# Patient Record
Sex: MALE | Race: BLACK OR AFRICAN AMERICAN | Employment: FULL TIME | URBAN - METROPOLITAN AREA
[De-identification: names, ages, dates, MRNs, and addresses within clinical notes are randomized per-mention and may not be internally consistent; named-entity substitution may affect disease eponyms.]

---

## 2017-05-17 ENCOUNTER — TELEPHONE (OUTPATIENT)
Dept: INTERNAL MEDICINE | Age: 47
End: 2017-05-17

## 2017-05-19 ENCOUNTER — APPOINTMENT (OUTPATIENT)
Dept: GENERAL RADIOLOGY | Age: 47
End: 2017-05-19
Payer: MEDICARE

## 2017-05-19 ENCOUNTER — HOSPITAL ENCOUNTER (EMERGENCY)
Age: 47
Discharge: HOME | End: 2017-05-19
Attending: EMERGENCY MEDICINE
Payer: MEDICARE

## 2017-05-19 VITALS
TEMPERATURE: 97.7 F | BODY MASS INDEX: 33.74 KG/M2 | SYSTOLIC BLOOD PRESSURE: 135 MMHG | WEIGHT: 215 LBS | OXYGEN SATURATION: 99 % | DIASTOLIC BLOOD PRESSURE: 77 MMHG | HEIGHT: 67 IN | HEART RATE: 74 BPM | RESPIRATION RATE: 16 BRPM

## 2017-05-19 DIAGNOSIS — M25.562 ACUTE PAIN OF LEFT KNEE: Primary | ICD-10-CM

## 2017-05-19 PROCEDURE — 73562 X-RAY EXAM OF KNEE 3: CPT

## 2017-05-19 PROCEDURE — 99283 EMERGENCY DEPT VISIT LOW MDM: CPT

## 2017-05-19 PROCEDURE — 6370000000 HC RX 637 (ALT 250 FOR IP): Performed by: EMERGENCY MEDICINE

## 2017-05-19 RX ORDER — HYDROCODONE BITARTRATE AND ACETAMINOPHEN 5; 325 MG/1; MG/1
2 TABLET ORAL ONCE
Status: COMPLETED | OUTPATIENT
Start: 2017-05-19 | End: 2017-05-19

## 2017-05-19 RX ORDER — IBUPROFEN 800 MG/1
800 TABLET ORAL EVERY 8 HOURS PRN
Qty: 30 TABLET | Refills: 0 | Status: SHIPPED | OUTPATIENT
Start: 2017-05-19 | End: 2018-10-16

## 2017-05-19 RX ADMIN — HYDROCODONE BITARTRATE AND ACETAMINOPHEN 2 TABLET: 5; 325 TABLET ORAL at 21:19

## 2017-05-19 ASSESSMENT — ENCOUNTER SYMPTOMS
VOMITING: 0
SHORTNESS OF BREATH: 0
ABDOMINAL PAIN: 0
NAUSEA: 0
SORE THROAT: 0
RHINORRHEA: 0
EYE DISCHARGE: 0

## 2017-05-19 ASSESSMENT — PAIN DESCRIPTION - PAIN TYPE: TYPE: ACUTE PAIN

## 2017-05-19 ASSESSMENT — PAIN DESCRIPTION - LOCATION: LOCATION: KNEE

## 2017-05-19 ASSESSMENT — PAIN SCALES - GENERAL
PAINLEVEL_OUTOF10: 10
PAINLEVEL_OUTOF10: 10

## 2017-05-19 ASSESSMENT — PAIN DESCRIPTION - ORIENTATION: ORIENTATION: LEFT

## 2017-05-26 ENCOUNTER — OFFICE VISIT (OUTPATIENT)
Dept: ORTHOPEDIC SURGERY | Age: 47
End: 2017-05-26
Payer: MEDICARE

## 2017-05-26 VITALS — BODY MASS INDEX: 33.74 KG/M2 | WEIGHT: 214.95 LBS | HEIGHT: 67 IN

## 2017-05-26 DIAGNOSIS — Z98.890 STATUS POST ARTHROSCOPY OF RIGHT KNEE: Primary | ICD-10-CM

## 2017-05-26 PROCEDURE — 99213 OFFICE O/P EST LOW 20 MIN: CPT | Performed by: ORTHOPAEDIC SURGERY

## 2017-05-26 RX ORDER — METHYLPREDNISOLONE 4 MG/1
TABLET ORAL
Qty: 1 KIT | Refills: 1 | Status: SHIPPED | OUTPATIENT
Start: 2017-05-26 | End: 2017-06-01

## 2017-05-26 ASSESSMENT — ENCOUNTER SYMPTOMS
COUGH: 0
NAUSEA: 0
DIARRHEA: 0
CONSTIPATION: 0

## 2018-09-29 ENCOUNTER — HOSPITAL ENCOUNTER (EMERGENCY)
Age: 48
Discharge: HOME | End: 2018-09-30
Attending: EMERGENCY MEDICINE
Payer: COMMERCIAL

## 2018-09-29 VITALS
BODY MASS INDEX: 35.16 KG/M2 | OXYGEN SATURATION: 98 % | HEIGHT: 67 IN | DIASTOLIC BLOOD PRESSURE: 90 MMHG | WEIGHT: 224 LBS | RESPIRATION RATE: 18 BRPM | SYSTOLIC BLOOD PRESSURE: 158 MMHG | HEART RATE: 54 BPM | TEMPERATURE: 97.9 F

## 2018-09-29 DIAGNOSIS — M25.512 ACUTE PAIN OF LEFT SHOULDER: Primary | ICD-10-CM

## 2018-09-29 PROCEDURE — 99283 EMERGENCY DEPT VISIT LOW MDM: CPT

## 2018-09-29 ASSESSMENT — PAIN SCALES - GENERAL: PAINLEVEL_OUTOF10: 10

## 2018-09-30 ENCOUNTER — APPOINTMENT (OUTPATIENT)
Dept: GENERAL RADIOLOGY | Age: 48
End: 2018-09-30
Payer: COMMERCIAL

## 2018-09-30 PROCEDURE — 6370000000 HC RX 637 (ALT 250 FOR IP): Performed by: EMERGENCY MEDICINE

## 2018-09-30 PROCEDURE — 73030 X-RAY EXAM OF SHOULDER: CPT

## 2018-09-30 RX ORDER — IBUPROFEN 800 MG/1
800 TABLET ORAL ONCE
Status: COMPLETED | OUTPATIENT
Start: 2018-09-30 | End: 2018-09-30

## 2018-09-30 RX ADMIN — IBUPROFEN 800 MG: 800 TABLET ORAL at 00:13

## 2018-09-30 ASSESSMENT — ENCOUNTER SYMPTOMS
COUGH: 0
ABDOMINAL PAIN: 0
NAUSEA: 0
EYE PAIN: 0
RHINORRHEA: 0
VOMITING: 0
SHORTNESS OF BREATH: 0
COLOR CHANGE: 0
SORE THROAT: 0

## 2018-09-30 ASSESSMENT — PAIN SCALES - GENERAL: PAINLEVEL_OUTOF10: 10

## 2018-09-30 NOTE — ED PROVIDER NOTES
Provider   ibuprofen (ADVIL;MOTRIN) 800 MG tablet Take 1 tablet by mouth every 8 hours as needed for Pain 5/19/17   Lucina June DO   diclofenac (VOLTAREN) 75 MG EC tablet Take 1 tablet by mouth 2 times daily 6/22/16   May Balderas DO       REVIEW OF SYSTEMS    (2-9 systems for level 4, 10 or more for level 5)      Review of Systems   Constitutional: Negative for chills and fever. HENT: Negative for rhinorrhea and sore throat. Eyes: Negative for pain and visual disturbance. Respiratory: Negative for cough and shortness of breath. Cardiovascular: Negative for chest pain and palpitations. Gastrointestinal: Negative for abdominal pain, nausea and vomiting. Genitourinary: Negative for difficulty urinating and dysuria. Musculoskeletal: Negative for arthralgias and myalgias. Left shoulder pain   Skin: Negative for color change and wound. Neurological: Negative for weakness, numbness and headaches. Psychiatric/Behavioral: Negative for behavioral problems and dysphoric mood. PHYSICAL EXAM   (up to 7 for level 4, 8 or more for level 5)      INITIAL VITALS:   BP (!) 158/90   Pulse 54   Temp 97.9 °F (36.6 °C) (Oral)   Resp 18   Ht 5' 7\" (1.702 m)   Wt 224 lb (101.6 kg)   SpO2 98%   BMI 35.08 kg/m²     Physical Exam   Constitutional: He is oriented to person, place, and time. He appears well-developed and well-nourished. No distress. HENT:   Head: Normocephalic and atraumatic. Eyes: Pupils are equal, round, and reactive to light. EOM are normal.   Neck: Normal range of motion. Cardiovascular: Normal rate. Pulmonary/Chest: Effort normal. No respiratory distress. Musculoskeletal: Normal range of motion.    Passive range of motion without any difficulties (family; however, does have some pain with active range of motion; no point tenderness from the shoulder, more so just superior to the left scapula and trapezius area; strong distal pulses with good cap refill; no

## 2018-10-16 ENCOUNTER — OFFICE VISIT (OUTPATIENT)
Dept: INTERNAL MEDICINE | Age: 48
End: 2018-10-16
Payer: COMMERCIAL

## 2018-10-16 VITALS
DIASTOLIC BLOOD PRESSURE: 78 MMHG | SYSTOLIC BLOOD PRESSURE: 122 MMHG | BODY MASS INDEX: 36.41 KG/M2 | HEIGHT: 67 IN | HEART RATE: 70 BPM | WEIGHT: 232 LBS

## 2018-10-16 DIAGNOSIS — Z00.00 HEALTHCARE MAINTENANCE: ICD-10-CM

## 2018-10-16 DIAGNOSIS — S46.912D SHOULDER STRAIN, LEFT, SUBSEQUENT ENCOUNTER: Primary | ICD-10-CM

## 2018-10-16 DIAGNOSIS — E66.09 CLASS 2 OBESITY DUE TO EXCESS CALORIES WITHOUT SERIOUS COMORBIDITY WITH BODY MASS INDEX (BMI) OF 36.0 TO 36.9 IN ADULT: ICD-10-CM

## 2018-10-16 DIAGNOSIS — Z13.220 SCREENING FOR HYPERLIPIDEMIA: ICD-10-CM

## 2018-10-16 PROCEDURE — 99213 OFFICE O/P EST LOW 20 MIN: CPT | Performed by: INTERNAL MEDICINE

## 2018-10-16 PROCEDURE — 99211 OFF/OP EST MAY X REQ PHY/QHP: CPT | Performed by: INTERNAL MEDICINE

## 2018-10-16 RX ORDER — NAPROXEN 500 MG/1
500 TABLET ORAL 2 TIMES DAILY WITH MEALS
Qty: 40 TABLET | Refills: 0 | Status: SHIPPED | OUTPATIENT
Start: 2018-10-16 | End: 2019-01-23 | Stop reason: ALTCHOICE

## 2018-10-16 ASSESSMENT — PATIENT HEALTH QUESTIONNAIRE - PHQ9
SUM OF ALL RESPONSES TO PHQ QUESTIONS 1-9: 0
SUM OF ALL RESPONSES TO PHQ9 QUESTIONS 1 & 2: 0
2. FEELING DOWN, DEPRESSED OR HOPELESS: 0
1. LITTLE INTEREST OR PLEASURE IN DOING THINGS: 0
SUM OF ALL RESPONSES TO PHQ QUESTIONS 1-9: 0

## 2018-10-16 NOTE — PATIENT INSTRUCTIONS
Patient Education        Shoulder Stretches: Exercises  Your Care Instructions  Here are some examples of exercises for your shoulder. Start each exercise slowly. Ease off the exercise if you start to have pain. Your doctor or physical therapist will tell you when you can start these exercises and which ones will work best for you. How to do the exercises  Shoulder stretch    1.  a doorway and place one arm against the door frame. Your elbow should be a little higher than your shoulder. 2. Relax your shoulders as you lean forward, allowing your chest and shoulder muscles to stretch. You can also turn your body slightly away from your arm to stretch the muscles even more. 3. Hold for 15 to 30 seconds. 4. Repeat 2 to 4 times with each arm. Shoulder and chest stretch    1. Shoulder and chest stretch  2. While sitting, relax your upper body so you slump slightly in your chair. 3. As you breathe in, straighten your back and open your arms out to the sides. 4. Gently pull your shoulder blades back and downward. 5. Hold for 15 to 30 seconds as your breathe normally. 6. Repeat 2 to 4 times. Overhead stretch    1. Reach up over your head with both arms. 2. Hold for 15 to 30 seconds. 3. Repeat 2 to 4 times. Follow-up care is a key part of your treatment and safety. Be sure to make and go to all appointments, and call your doctor if you are having problems. It's also a good idea to know your test results and keep a list of the medicines you take. Where can you learn more? Go to https://[employer]. org and sign in to your [employer] account. Enter S254 in the [facility] box to learn more about \"Shoulder Stretches: Exercises. \"     If you do not have an account, please click on the \"Sign Up Now\" link. Current as of: November 29, 2017  Content Version: 11.7  © 0811-0967 [employer]. Care instructions adapted under license by [facility] ([facility]).  If you have questions about a medical condition or this instruction, always ask your healthcare professional. [address] any warranty or liability for your use of this information. Patient Education        Rotator Cuff: Exercises  Your Care Instructions  Here are some examples of typical rehabilitation exercises for your condition. Start each exercise slowly. Ease off the exercise if you start to have pain. Your doctor or physical therapist will tell you when you can start these exercises and which ones will work best for you. How to do the exercises  Pendulum swing    If you have pain in your back, do not do this exercise. 5. Hold on to a table or the back of a chair with your good arm. Then bend forward a little and let your sore arm hang straight down. This exercise does not use the arm muscles. Rather, use your legs and your hips to create movement that makes your arm swing freely. 6. Use the movement from your hips and legs to guide the slightly swinging arm back and forth like a pendulum (or elephant trunk). Then guide it in circles that start small (about the size of a dinner plate). Make the circles a bit larger each day, as your pain allows. 7. Do this exercise for 5 minutes, 5 to 7 times each day. 8. As you have less pain, try bending over a little farther to do this exercise. This will increase the amount of movement at your shoulder. Posterior stretching exercise    7. Hold the elbow of your injured arm with your other hand. 8. Use your hand to pull your injured arm gently up and across your body. You will feel a gentle stretch across the back of your injured shoulder. 9. Hold for at least 15 to 30 seconds. Then slowly lower your arm. 10. Repeat 2 to 4 times. Up-the-back stretch    Your doctor or physical therapist may want you to wait to do this stretch until you have regained most of your range of motion and strength. You can do this stretch in different ways.  Hold any of these stretches for at least 15 to 30 seconds. Repeat them 2 to 4 times. 4. Put your hand in your back pocket. Let it rest there to stretch your shoulder. 5. With your other hand, hold your injured arm (palm outward) behind your back by the wrist. Pull your arm up gently to stretch your shoulder. 6. Next, put a towel over your other shoulder. Put the hand of your injured arm behind your back. Now hold the back end of the towel. With the other hand, hold the front end of the towel in front of your body. Pull gently on the front end of the towel. This will bring your hand farther up your back to stretch your shoulder. Overhead stretch    1. Standing about an arm's length away, grasp onto a solid surface. You could use a countertop, a doorknob, or the back of a sturdy chair. 2. With your knees slightly bent, bend forward with your arms straight. Lower your upper body, and let your shoulders stretch. 3. As your shoulders are able to stretch farther, you may need to take a step or two backward. 4. Hold for at least 15 to 30 seconds. Then stand up and relax. If you had stepped back during your stretch, step forward so you can keep your hands on the solid surface. 5. Repeat 2 to 4 times. Shoulder flexion (lying down)    To make a wand for this exercise, use a piece of PVC pipe or a broom handle with the broom removed. Make the wand about a foot wider than your shoulders. 1. Lie on your back, holding a wand with both hands. Your palms should face down as you hold the wand. 2. Keeping your elbows straight, slowly raise your arms over your head. Raise them until you feel a stretch in your shoulders, upper back, and chest.  3. Hold for 15 to 30 seconds. 4. Repeat 2 to 4 times. Shoulder rotation (lying down)    To make a wand for this exercise, use a piece of PVC pipe or a broom handle with the broom removed. Make the wand about a foot wider than your shoulders. 1. Lie on your back.  Hold a wand with both hands with your

## 2019-01-23 ENCOUNTER — HOSPITAL ENCOUNTER (EMERGENCY)
Age: 49
Discharge: HOME | End: 2019-01-23
Attending: EMERGENCY MEDICINE
Payer: COMMERCIAL

## 2019-01-23 VITALS
HEIGHT: 67 IN | DIASTOLIC BLOOD PRESSURE: 79 MMHG | BODY MASS INDEX: 33.74 KG/M2 | HEART RATE: 58 BPM | RESPIRATION RATE: 20 BRPM | TEMPERATURE: 97.5 F | WEIGHT: 215 LBS | SYSTOLIC BLOOD PRESSURE: 125 MMHG | OXYGEN SATURATION: 100 %

## 2019-01-23 DIAGNOSIS — S43.402A SPRAIN OF LEFT SHOULDER, UNSPECIFIED SHOULDER SPRAIN TYPE, INITIAL ENCOUNTER: Primary | ICD-10-CM

## 2019-01-23 PROCEDURE — 99283 EMERGENCY DEPT VISIT LOW MDM: CPT

## 2019-01-23 PROCEDURE — 6370000000 HC RX 637 (ALT 250 FOR IP): Performed by: NURSE PRACTITIONER

## 2019-01-23 RX ORDER — HYDROCODONE BITARTRATE AND ACETAMINOPHEN 5; 325 MG/1; MG/1
1 TABLET ORAL EVERY 6 HOURS PRN
Qty: 20 TABLET | Refills: 0 | Status: SHIPPED | OUTPATIENT
Start: 2019-01-23 | End: 2019-01-28

## 2019-01-23 RX ORDER — HYDROCODONE BITARTRATE AND ACETAMINOPHEN 5; 325 MG/1; MG/1
2 TABLET ORAL ONCE
Status: COMPLETED | OUTPATIENT
Start: 2019-01-23 | End: 2019-01-23

## 2019-01-23 RX ADMIN — HYDROCODONE BITARTRATE AND ACETAMINOPHEN 2 TABLET: 5; 325 TABLET ORAL at 22:06

## 2019-01-23 ASSESSMENT — PAIN DESCRIPTION - ORIENTATION: ORIENTATION: LEFT

## 2019-01-23 ASSESSMENT — PAIN DESCRIPTION - LOCATION: LOCATION: SHOULDER

## 2019-01-23 ASSESSMENT — PAIN DESCRIPTION - PAIN TYPE: TYPE: ACUTE PAIN

## 2019-01-23 ASSESSMENT — PAIN DESCRIPTION - FREQUENCY: FREQUENCY: CONTINUOUS

## 2019-01-23 ASSESSMENT — PAIN DESCRIPTION - ONSET: ONSET: ON-GOING

## 2019-01-23 ASSESSMENT — PAIN DESCRIPTION - DESCRIPTORS: DESCRIPTORS: SHARP

## 2019-01-23 ASSESSMENT — PAIN DESCRIPTION - PROGRESSION: CLINICAL_PROGRESSION: NOT CHANGED

## 2019-01-23 ASSESSMENT — PAIN SCALES - GENERAL
PAINLEVEL_OUTOF10: 10
PAINLEVEL_OUTOF10: 10

## 2019-01-24 ASSESSMENT — ENCOUNTER SYMPTOMS
SORE THROAT: 0
SINUS PRESSURE: 0
VOMITING: 0
NAUSEA: 0
ABDOMINAL PAIN: 0
COLOR CHANGE: 0
COUGH: 0
RHINORRHEA: 0
WHEEZING: 0
SHORTNESS OF BREATH: 0
CONSTIPATION: 0
DIARRHEA: 0

## 2022-03-04 ENCOUNTER — TELEPHONE (OUTPATIENT)
Dept: INTERNAL MEDICINE | Age: 52
End: 2022-03-04

## 2022-03-04 NOTE — LETTER
[facility] [address]  [address] [address]  Phone: [phone]  Fax: [phone]    Petr Alcocer MD        March 4, 2022     Fito Polk  [facility] [place]      Dear Tyler Frankel: We missed seeing you for a scheduled appointment at [address] [place] [address] with Petr Alcocer MD on 3/4/2022. We're sorry you were unable to keep your appointment and hope that you are doing well. We ask that you please call 24 hours in advance if you are unable to make your appointment, so that we can give that time to another patient in need. We care about you and the management of your healthcare and want to make sure that you follow up as recommended. To provide quality care and timely appointments to all our patients, you may be dismissed from the practice if you do not show for three (3) scheduled appointments within a 12-month period. We would like to continue treating your healthcare needs. Please call the office to reschedule your appointment, if needed.      Sincerely,        Petr Alcocer MD

## 2022-03-17 ENCOUNTER — NURSE TRIAGE (OUTPATIENT)
Dept: OTHER | Facility: CLINIC | Age: 52
End: 2022-03-17

## 2022-03-17 ENCOUNTER — OFFICE VISIT (OUTPATIENT)
Dept: FAMILY MEDICINE CLINIC | Age: 52
End: 2022-03-17
Payer: COMMERCIAL

## 2022-03-17 VITALS
SYSTOLIC BLOOD PRESSURE: 132 MMHG | HEIGHT: 67 IN | OXYGEN SATURATION: 98 % | HEART RATE: 74 BPM | DIASTOLIC BLOOD PRESSURE: 84 MMHG | TEMPERATURE: 97.4 F | BODY MASS INDEX: 31.71 KG/M2 | WEIGHT: 202 LBS

## 2022-03-17 DIAGNOSIS — Z13.220 LIPID SCREENING: ICD-10-CM

## 2022-03-17 DIAGNOSIS — R06.83 SNORING: ICD-10-CM

## 2022-03-17 DIAGNOSIS — M54.12 CERVICAL RADICULOPATHY: Primary | ICD-10-CM

## 2022-03-17 DIAGNOSIS — Z12.5 PROSTATE CANCER SCREENING: ICD-10-CM

## 2022-03-17 DIAGNOSIS — K59.03 THERAPEUTIC OPIOID-INDUCED CONSTIPATION (OIC): ICD-10-CM

## 2022-03-17 DIAGNOSIS — K62.5 BRIGHT RED BLOOD PER RECTUM: ICD-10-CM

## 2022-03-17 DIAGNOSIS — Z13.29 THYROID DISORDER SCREENING: ICD-10-CM

## 2022-03-17 DIAGNOSIS — T40.2X5A THERAPEUTIC OPIOID-INDUCED CONSTIPATION (OIC): ICD-10-CM

## 2022-03-17 DIAGNOSIS — Z13.1 DIABETES MELLITUS SCREENING: ICD-10-CM

## 2022-03-17 PROCEDURE — 99203 OFFICE O/P NEW LOW 30 MIN: CPT | Performed by: NURSE PRACTITIONER

## 2022-03-17 RX ORDER — IBUPROFEN 800 MG/1
800 TABLET ORAL PRN
COMMUNITY

## 2022-03-17 RX ORDER — DOCUSATE SODIUM 100 MG/1
100 CAPSULE, LIQUID FILLED ORAL DAILY PRN
Qty: 30 CAPSULE | Refills: 2 | Status: SHIPPED | OUTPATIENT
Start: 2022-03-17 | End: 2022-07-13

## 2022-03-17 RX ORDER — OXYCODONE HYDROCHLORIDE AND ACETAMINOPHEN 5; 325 MG/1; MG/1
1 TABLET ORAL DAILY
COMMUNITY
End: 2022-07-13

## 2022-03-17 RX ORDER — TIZANIDINE 4 MG/1
4 TABLET ORAL EVERY 6 HOURS PRN
COMMUNITY
End: 2022-07-13

## 2022-03-17 ASSESSMENT — PATIENT HEALTH QUESTIONNAIRE - PHQ9
SUM OF ALL RESPONSES TO PHQ QUESTIONS 1-9: 0
SUM OF ALL RESPONSES TO PHQ QUESTIONS 1-9: 0
SUM OF ALL RESPONSES TO PHQ9 QUESTIONS 1 & 2: 0
SUM OF ALL RESPONSES TO PHQ QUESTIONS 1-9: 0
2. FEELING DOWN, DEPRESSED OR HOPELESS: 0
1. LITTLE INTEREST OR PLEASURE IN DOING THINGS: 0
SUM OF ALL RESPONSES TO PHQ QUESTIONS 1-9: 0

## 2022-03-17 ASSESSMENT — ENCOUNTER SYMPTOMS
BACK PAIN: 0
SINUS PAIN: 0
DIARRHEA: 0
VOMITING: 0
NAUSEA: 0
SHORTNESS OF BREATH: 0
BLOOD IN STOOL: 1
CONSTIPATION: 1
ABDOMINAL PAIN: 0
SORE THROAT: 0
COUGH: 0
EYE PAIN: 0

## 2022-03-17 NOTE — PROGRESS NOTES
[facility]  [address]  [address] [place] [address]  Dept: [phone]  Dept Fax: [phone]    Johanna Carrier is a 54 y.o. male who presents today for his medicalconditions/complaints as noted below. Johanna Carrier is c/o of Neck Pain (pt is having neck pain and stiffness. pt is going to pain managment ) and Rectal Bleeding      HPI:       82-year-old male patient presents with complaints of encounter to establish care. Significant history of chronic neck pain, cervical radiculopathy. Reports that he has had chronic neck pain for several years. Patient follows with premedical spine care Dr. Kristan Cotto, had positive MRI in 2020 has since had a subsequently injury. Patient follows with pain management doctor Lakeisha and treats with Percocet, Zanaflex, Motrin, additionally uses medical marijuana. Patient does have concerns for constipation and blood in his stool. Patient reportedly does not have regular bowel movements. Patient does not treat with any stool softeners or laxatives despite chronic opiate use. Patient denies any rectal pain. Does report blood when wiping and bright red blood in his toilet. Has never had a colonoscopy. Denies abdominal pain, nausea, vomiting or diarrhea. Denies previous abdominal hx or surgeries. Concerns for sleep apnea, patient does snore, witnessed episodes of apnea, chronic fatigue      Past Medical History:   Diagnosis Date    Knee pain, right     trauma at work 11/15    Medial meniscus tear Nov. 18 2015    Snores     possible apnea but not tested yet        Current Outpatient Medications   Medication Sig Dispense Refill    ibuprofen (ADVIL;MOTRIN) 800 MG tablet Take 800 mg by mouth as needed      medical marijuana Inhale into the lungs daily.  oxyCODONE-acetaminophen (PERCOCET) 5-325 MG per tablet Take 1 tablet by mouth daily.       tiZANidine (ZANAFLEX) 4 MG tablet Take 4 mg by mouth every 6 hours as needed Take 1/2 to 1 tablet by mouth two times a day as needed      docusate sodium (COLACE) 100 MG capsule Take 1 capsule by mouth daily as needed for Constipation 30 capsule 2     No current facility-administered medications for this visit. Allergies   Allergen Reactions    Pear Itching    Hydrocodone-Acetaminophen Other (See Comments) and Itching       Subjective:      Review of Systems   Constitutional: Positive for fatigue. Negative for chills. HENT: Negative for congestion, ear pain, sinus pain and sore throat. Eyes: Negative for pain and visual disturbance. Respiratory: Negative for cough and shortness of breath. Cardiovascular: Negative for chest pain and palpitations. Gastrointestinal: Positive for blood in stool and constipation. Negative for abdominal pain, diarrhea, nausea and vomiting. Genitourinary: Negative for penile pain and testicular pain. Musculoskeletal: Positive for neck pain. Negative for back pain and joint swelling. Skin: Negative for rash. Neurological: Negative for dizziness and light-headedness. Hematological: Does not bruise/bleed easily. All other systems reviewed and are negative.      :Objective     Physical Exam  Vitals and nursing note reviewed. Constitutional:       Appearance: Normal appearance. He is normal weight. HENT:      Mouth/Throat:      Mouth: Mucous membranes are moist.   Cardiovascular:      Rate and Rhythm: Normal rate. Pulmonary:      Effort: Pulmonary effort is normal.      Breath sounds: Normal breath sounds. Abdominal:      Palpations: Abdomen is soft. Tenderness: There is no abdominal tenderness. Musculoskeletal:      Cervical back: Spasms, torticollis, tenderness and bony tenderness present. Neurological:      General: No focal deficit present. Mental Status: He is alert and oriented to person, place, and time.    Psychiatric:         Mood and Affect: Mood normal.         Behavior: Behavior normal.       /84 (Site: Left Upper Arm, Position: Sitting, Cuff Size: Medium Adult)   Pulse 74   Temp 97.4 °F (36.3 °C) (Tympanic)   Ht 5' 7\" (1.702 m)   Wt 202 lb (91.6 kg)   SpO2 98%   BMI 31.64 kg/m²     Lab Review   No visits with results within 2 Month(s) from this visit.    Latest known visit with results is:   Hospital Outpatient Visit on 04/25/2016   Component Date Value    WBC 04/25/2016 4.2     RBC 04/25/2016 4.81     Hemoglobin 04/25/2016 12.8*    Hematocrit 04/25/2016 38.8*    MCV 04/25/2016 80.6     MCH 04/25/2016 26.6     MCHC 04/25/2016 33.0     RDW 04/25/2016 14.3     Platelets 79/31/1222 185     MPV 04/25/2016 8.7     Glucose 04/25/2016 110*    BUN 04/25/2016 13     CREATININE 04/25/2016 1.19     Bun/Cre Ratio 04/25/2016 NOT REPORTED     Calcium 04/25/2016 8.5*    Sodium 04/25/2016 145*    Potassium 04/25/2016 4.0     Chloride 04/25/2016 108*    CO2 04/25/2016 24     Anion Gap 04/25/2016 13     Alkaline Phosphatase 04/25/2016 40     ALT 04/25/2016 20     AST 04/25/2016 19     Total Bilirubin 04/25/2016 0.31     Total Protein 04/25/2016 5.8*    Albumin 04/25/2016 3.6     Albumin/Globulin Ratio 04/25/2016 1.6     GFR Non- 04/25/2016 >60     GFR  04/25/2016 >60     GFR Comment 04/25/2016          GFR Staging 04/25/2016 NOT REPORTED     Color, UA 04/25/2016 YELLOW     Turbidity UA 04/25/2016 CLEAR     Glucose, Ur 04/25/2016 NEGATIVE     Bilirubin Urine 04/25/2016 NEGATIVE     Ketones, Urine 04/25/2016 NEGATIVE     Specific Dr[place]co, UA 04/25/2016 1.027     Urine Hgb 04/25/2016 NEGATIVE     pH, UA 04/25/2016 6.0     Protein, UA 04/25/2016 NEGATIVE     Urobilinogen, Urine 04/25/2016 Normal     Nitrite, Urine 04/25/2016 NEGATIVE     Leukocyte Esterase, Urine 04/25/2016 NEGATIVE     Urinalysis Comments 04/25/2016 Microscopic exam not performed based on chemical results unless requested in        Dr. Bret Reyes 2/25/21 left C5-6 and C6-7 TFESI  3/4/21 right C5-6 and C6-7 TFESI  cervical injections with 80-90% relief for several months     -Imaging:   Cervical spine MRI March 17, 2020  Findings:  Cervical vertebral body heights and alignment and signal are normal. Craniocervical junction is normal.  Signal in the cervical spinal cord is normal.   There is reversal of the normal cervical curvature. There are degenerative disc changes at all cervical levels with disc space narrowing and spurs. At C2-3, there is tiny central disc protrusion without spinal stenosis. At C3-4, there is moderate size central disc protrusion/herniation that touches and slightly flattens the anterior mid spinal cord. At C4-5, there is disc bulge and posterior spurs with mild cord flattening. At C5-6, there is degenerative disc disease. There is disc bulge greater towards the right as well as posterior spurring resulting in mild flattening of the right anterior spinal cord   At C6-7, there is left paracentral and foraminal disc protrusion with mild flattening of the left anterior spinal cord. There is posterior spurring. At C7-T1, no disc herniation or spinal stenosis. Impression:  1. C3-4 central disc herniation with flattening of the mid anterior cervical spinal cord. 2. C5-6 disc bulge greater towards the right with mild right cord flattening. 3. C6-7 left paracentral foraminal disc protrusion with mild left cord flattening. Assessment and Plan      1. Cervical radiculopathy  -     CBC; Future  -     MRI CERVICAL SPINE WO CONTRAST; Future  -     [facility] [place]  -     External Referral To Pain Clinic  2. Bright red blood per rectum  -     CBC; Future  -     [facility]  3. Thyroid disorder screening  -     CBC; Future  -     TSH With Reflex Ft4; Future  4. Lipid screening  -     CBC; Future  -     Lipid, Fasting; Future  5.  Diabetes mellitus screening  -     Hemoglobin A1C; Future  -     CBC; Future  -     Comprehensive Metabolic Panel; Future  6. Snoring  -     CBC; Future  -     Baseline Diagnostic Sleep Study; Future  7. Prostate cancer screening  -     PSA Screening; Future  8. Therapeutic opioid-induced constipation (OIC)  -     docusate sodium (COLACE) 100 MG capsule; Take 1 capsule by mouth daily as needed for Constipation, Disp-30 capsule, R-2Normal       Recommend starting stool softener daily given chronic opiate use and colonoscopy    Labs ordered    Previous mri ordered, updated MRI order given injury  Physical therapy sent    F/u after testing           [facility] Sleep Apnea screen    0= no, 1= yes    Snore? 1    Tired, fatigued often? 1    Observed stop breathing? 1    Treatment for high blood pressure? 0    BMI over 35 ? 0    Age over 48? 0    Neck circumference over 40 cm? 1    Gener Male? 0    Score 4indicates higher risk    Range 0-2 = low risk, 3+ = high risk              No results found for this visit on 03/17/22. Return in about 3 months (around 6/17/2022), or if symptoms worsen or fail to improve. Orders Placed This Encounter   Medications    docusate sodium (COLACE) 100 MG capsule     Sig: Take 1 capsule by mouth daily as needed for Constipation     Dispense:  30 capsule     Refill:  2        Patient given educational materials - see patient instructions. Discussed use, benefit, and side effects of prescribed medications. All patientquestions answered. Pt voiced understanding. Patient given educational materials - see patient instructions. Discussed use, benefit, and side effects of prescribed medications. All patientquestions answered. Pt voiced understanding. This note was transcribed using dictation with Dragon services. Efforts were made to correct any errors but some words may be misinterpreted.     Patient assumes risks associated with failure to complete recommended testing and treatments in a timely manner    Electronically signed by David Gupta KELTON Nolasco - CNP on 3/17/2022at 9:17 PM

## 2022-03-17 NOTE — PATIENT INSTRUCTIONS

## 2022-03-17 NOTE — TELEPHONE ENCOUNTER
Received call from Dav Byrnes at [facility] with [employer]. Subjective: Caller's  Wife is on the [facility] had surgery on both shoulders last year for rotator cuff repair. He heard a pop in the right shoulder and there is big knot between the neck and shoulder. He has been having blood in his stool that is bright red. He is on pain management for neck spurs. Unable to move neck without pain. \"     Current Symptoms: Right shoulder pain, blood in stool, neck pain    Onset: 1 week ago; unchanged    Associated Symptoms: reduced activity    Pain Severity: 8/10    Temperature: none     What has been tried: pain medication    LMP: NA Pregnant: NA    Recommended disposition: See in Office Today    Care advice provided, patient verbalizes understanding; denies any other questions or concerns; instructed to call back for any new or worsening symptoms. Patient/Caller agrees with recommended disposition; writer provided warm transfer to Prashant Lynn at [facility] for appointment scheduling     Attention Provider: Thank you for allowing me to participate in the care of your patient. The patient was connected to triage in response to information provided to the ECC/PSC. Please do not respond through this encounter as the response is not directed to a shared pool.         Reason for Disposition   Patient wants to be seen    Protocols used: RECTAL BLEEDING-ADULT-[place]

## 2022-03-24 ENCOUNTER — HOSPITAL ENCOUNTER (OUTPATIENT)
Dept: MRI IMAGING | Facility: CLINIC | Age: 52
Discharge: HOME | End: 2022-03-26
Payer: COMMERCIAL

## 2022-03-24 DIAGNOSIS — M54.12 CERVICAL RADICULOPATHY: ICD-10-CM

## 2022-03-24 PROCEDURE — 72141 MRI NECK SPINE W/O DYE: CPT

## 2022-03-25 DIAGNOSIS — M54.12 CERVICAL RADICULOPATHY: Primary | ICD-10-CM

## 2022-03-28 ENCOUNTER — TELEPHONE (OUTPATIENT)
Dept: FAMILY MEDICINE CLINIC | Age: 52
End: 2022-03-28

## 2022-03-28 DIAGNOSIS — M54.12 CERVICAL RADICULOPATHY: Primary | ICD-10-CM

## 2022-03-28 NOTE — TELEPHONE ENCOUNTER
----- Message from Germania Ortiz sent at 3/25/2022  4:05 PM EDT -----  Subject: Message to Provider    QUESTIONS  Information for Provider? pt called to see if he needed a note to see if   he can stay off of work or see the neuro surgeon  ---------------------------------------------------------------------------  --------------  [address]  What is the best way for the office to contact you? OK to leave message on   voicemail  Preferred Call Back Phone Number? [phone]  ---------------------------------------------------------------------------  --------------  SCRIPT ANSWERS  Relationship to Patient?  Self

## 2022-03-28 NOTE — TELEPHONE ENCOUNTER
Patient advised. Patient states he is going to schedule an appointment with the specialist soon. He wants to do a couple weeks of PT before going back to work to make sure he's healed.

## 2022-03-28 NOTE — LETTER
[address] [place] [address] [address] [address]  Phone: [phone]  Fax: [phone]    KELTON Negrete CNP        March 28, 2022     Patient: Mitesh Antonio   YOB: 1970   Date of Visit: 3/28/2022       To Whom It May Concern: It is my medical opinion that Darus Hopping is excused through 4/3/22. If you have any questions or concerns, please don't hesitate to call.     Sincerely,        KELTON Negrete CNP

## 2022-04-11 ENCOUNTER — TELEPHONE (OUTPATIENT)
Dept: SURGERY | Age: 52
End: 2022-04-11

## 2022-04-11 ENCOUNTER — OFFICE VISIT (OUTPATIENT)
Dept: SURGERY | Age: 52
End: 2022-04-11
Payer: COMMERCIAL

## 2022-04-11 VITALS — BODY MASS INDEX: 31.71 KG/M2 | RESPIRATION RATE: 12 BRPM | HEIGHT: 67 IN | OXYGEN SATURATION: 100 % | WEIGHT: 202 LBS

## 2022-04-11 DIAGNOSIS — R10.32 LEFT GROIN PAIN: Primary | ICD-10-CM

## 2022-04-11 DIAGNOSIS — K59.09 CHRONIC CONSTIPATION: ICD-10-CM

## 2022-04-11 DIAGNOSIS — K62.5 BRBPR (BRIGHT RED BLOOD PER RECTUM): ICD-10-CM

## 2022-04-11 PROCEDURE — 99203 OFFICE O/P NEW LOW 30 MIN: CPT | Performed by: SURGERY

## 2022-04-11 RX ORDER — SOD SULF/POT CHLORIDE/MAG SULF 1.479 G
TABLET ORAL
Qty: 24 TABLET | Refills: 0 | Status: SHIPPED | OUTPATIENT
Start: 2022-04-11 | End: 2022-07-13

## 2022-04-11 NOTE — PROGRESS NOTES
[id number] [address] [facility] Surgery   History & Physical  Darrick DO Piero  Pt Name: Tera Mo  MRN: [id number]  YOB: 1970  Date of evaluation: 4/11/2022  Primary Care Physician: KELTON Sewell - CNP    Chief Complaint: chronic constipation, brbpr, L groin discomfort      SUBJECTIVE:    History of Present Illness: This is a 54 y.o.  male who presents for evaluation for several issues:    1. Chronic constipation, brbpr: pt reports intermittent painless brbpr when he feels constipated, pt has had this for some time and also feels that his ibuprofen may make this worse. Blood in stool is limited and resolves spontaneously. No prior cscope, no [facility]    2. Intermittent L groin pain with strain at work and with BM when he is constipated, thinks he may have had [facility] repair on that side as a baby/child. Denies testicle pain or history of obstruction. Chart review performed to add information to the HPI: Yes    Past Medical History   has a past medical history of Knee pain, right, Medial meniscus tear, and Snores. Past Surgical History   has a past surgical history that includes Umbilical hernia repair (1972); Vasectomy; Knee arthroscopy (Right, 5/3/16); and Rotator cuff repair. Family History  family history is not on file. Social History  Tobacco use:  reports that he quit smoking about 9 years ago. His smoking use included cigarettes. He started smoking about 30 years ago. He has never used smokeless tobacco.  Alcohol use:  reports no history of alcohol use. Drug use:  reports current drug use. Drug: Marijuana Rosi Dasen). Medications  Current Medications:   Current Outpatient Medications   Medication Sig Dispense Refill    Sodium Sulfate-Mag Sulfate-KCl (SUTAB) 2087-883-307 MG TABS Follow bowel prep instructions 24 tablet 0    ibuprofen (ADVIL;MOTRIN) 800 MG tablet Take 800 mg by mouth as needed      medical marijuana Inhale into the lungs daily.       oxyCODONE-acetaminophen (PERCOCET) 5-325 MG per tablet Take 1 tablet by mouth daily.  tiZANidine (ZANAFLEX) 4 MG tablet Take 4 mg by mouth every 6 hours as needed Take 1/2 to 1 tablet by mouth two times a day as needed      docusate sodium (COLACE) 100 MG capsule Take 1 capsule by mouth daily as needed for Constipation 30 capsule 2     No current facility-administered medications for this visit. Home Medications:   Prior to Admission medications    Medication Sig Start Date End Date Taking? Authorizing Provider   Sodium Sulfate-Mag Sulfate-KCl (SUTAB) 5856-445-323 MG TABS Follow bowel prep instructions 4/11/22  Yes Miguel Durham,    ibuprofen (ADVIL;MOTRIN) 800 MG tablet Take 800 mg by mouth as needed   Yes Historical Provider, MD   medical marijuana Inhale into the lungs daily. Yes Historical Provider, MD   oxyCODONE-acetaminophen (PERCOCET) 5-325 MG per tablet Take 1 tablet by mouth daily. Yes Historical Provider, MD   tiZANidine (ZANAFLEX) 4 MG tablet Take 4 mg by mouth every 6 hours as needed Take 1/2 to 1 tablet by mouth two times a day as needed   Yes Historical Provider, MD   docusate sodium (COLACE) 100 MG capsule Take 1 capsule by mouth daily as needed for Constipation 3/17/22  Yes KELTON Stewart - CNP       Allergies  Pear and Hydrocodone-acetaminophen      Review of Systems:  General: Denies any fever, chills. Eyes: Denies any changes in vision, diplopia or eye pain  Ears, Nose, Mouth: Denies changes in hearing/tinnitus or drainage from ears, no rhinorrhea or bloody nose, no difficulty chewing  Throat: no difficulty swallowing, no throat pain  Respiratory: Denies any shortness of breath or cough. Cardiac: Denies any chest pain, palpitations, claudication or edema. Gastrointestinal: chronic constipation, h/o brbpr    Genitourinary: Denies any frequency, urgency, hesitancy or incontinence.   Musculoskeletal: Denies worsening muscle weakness or recent trauma  Skin: Denies rashes or lesions  Psychiatric: Denies any recent changes in mood or affect  Hematologic: Denies bruising or bleeding easily. PHYSICAL EXAMINATION  Vitals:   Vitals:    04/11/22 0938   Resp: 12   SpO2: 100%       General Appearance:  awake, alert, no acute distress, well developed, well nourished   Skin:  Skin color, texture, turgor normal. No rashes or lesions. Head/face:  NCAT, face symmetrical  Eyes:  PERRL, no evidence of conjunctivitis or ptosis bilaterally  Ears:  External ears and canals grossly normal, no evidence of otorrhea. Nose/Sinuses:  Nares normal. Septum midline. Mucosa normal. No external drainage noted. Mouth/Neck:  Mucosa moist.  No external oral lesions. Trachea midline. No visible masses. Lungs:  Normal chest expansion, unlabored breathing without accessory muscle use. No audible rales, rhonchi, or wheezing. Cardiovascular: S1S2. No evidence of JVD. No evidence of pulsatile masses in abdomen  Abdomen:  Soft, non-tender, no organomegaly, no masses. No left inguinal bulge on valsalva although the left inguinal region appears somewhat more prominent compared to the right. Musculoskeletal: No evidence of bony/muscular deformities, trauma, atrophy of either left/right upper/lower extremity. No evidence of digital clubbing or cyanosis. Neurologic:  CN 2-12 grossly intact without obvious deficits. Grossly normal sensation in all extremities. Psychiatric: appropriate judgement and insight, appropriate recall of recent and remote memory, no evidence of depression/anxiety/agitation  Rectal: small external hemorrhoid present without complication          DIAGNOSES:   Diagnosis Orders   1. Left groin pain  US PELVIS LIMITED   2. BRBPR (bright red blood per rectum)     3. Chronic constipation         PLAN:  · Recommend diagnostic colonoscopy, pt is agreeable to this. The risks include bleeding, infection, scarring, perforation, damage to surrounding tissues, need for further surgery in the future.  The benefits, alternatives, complications and procedure details were explained to the pt, all questions were answered. Will plan internal hemorrhoid treatment during this procedure which pt is also agreeable to  · Soft tissue US of the L groin to r/o inguinal hernia, further recommendations to follow.       Medical Decision Making: low complexity     Electronically signed by Jean Paul Barnett DO on 4/11/2022 at 10:09 AM

## 2022-04-11 NOTE — LETTER
[facility]  Surgical Specialties - General Surgery  [address]  [place], [address]  Phone: [phone]  Fax: [phone]      22    Patient: Anna Wise  MRN: [id number]  : 1970  Date of visit: 2022    Dear Juan Alvarez, KELTON - CNP:      Thank you for requesting consultation for Anna Wise in regards to evaluation for chronic constipation, BRBPR, L groin pain. We will obtain soft tissue US to r/o [facility], plan is to proceed with diagnostic colonoscopy with possible internal hemorrhoid treatment to investigate the patient's chronic constipation as well as bloody stools. Below are the relevant portions of my assessment and plan of care. If you have questions, please do not hesitate to call me. I look forward to following Anna Wise along with you.     Sincerely,        Rafal Dahl, DO

## 2022-04-19 DIAGNOSIS — R10.32 LEFT GROIN PAIN: Primary | ICD-10-CM

## 2022-04-20 ENCOUNTER — ANESTHESIA EVENT (OUTPATIENT)
Dept: OPERATING ROOM | Age: 52
End: 2022-04-20
Payer: COMMERCIAL

## 2022-04-20 ENCOUNTER — TELEPHONE (OUTPATIENT)
Dept: SURGERY | Age: 52
End: 2022-04-20

## 2022-04-20 NOTE — TELEPHONE ENCOUNTER
4/20/22-  Spoke to patient, surgery start time moved to 10:30am, arrival time is 9:00am. Patient confirmed.

## 2022-04-22 ENCOUNTER — ANESTHESIA (OUTPATIENT)
Dept: OPERATING ROOM | Age: 52
End: 2022-04-22
Payer: COMMERCIAL

## 2022-04-22 ENCOUNTER — HOSPITAL ENCOUNTER (OUTPATIENT)
Age: 52
Setting detail: OUTPATIENT SURGERY
Discharge: HOME | End: 2022-04-22
Attending: SURGERY | Admitting: SURGERY
Payer: COMMERCIAL

## 2022-04-22 VITALS
BODY MASS INDEX: 31.9 KG/M2 | DIASTOLIC BLOOD PRESSURE: 75 MMHG | RESPIRATION RATE: 19 BRPM | SYSTOLIC BLOOD PRESSURE: 119 MMHG | WEIGHT: 203.25 LBS | TEMPERATURE: 97 F | HEART RATE: 51 BPM | HEIGHT: 67 IN | OXYGEN SATURATION: 98 %

## 2022-04-22 VITALS
DIASTOLIC BLOOD PRESSURE: 54 MMHG | SYSTOLIC BLOOD PRESSURE: 89 MMHG | OXYGEN SATURATION: 100 % | RESPIRATION RATE: 23 BRPM

## 2022-04-22 PROCEDURE — 45384 COLONOSCOPY W/LESION REMOVAL: CPT | Performed by: SURGERY

## 2022-04-22 PROCEDURE — 7100000010 HC PHASE II RECOVERY - FIRST 15 MIN: Performed by: SURGERY

## 2022-04-22 PROCEDURE — 7100000011 HC PHASE II RECOVERY - ADDTL 15 MIN: Performed by: SURGERY

## 2022-04-22 PROCEDURE — 2709999900 HC NON-CHARGEABLE SUPPLY: Performed by: SURGERY

## 2022-04-22 PROCEDURE — 3700000000 HC ANESTHESIA ATTENDED CARE: Performed by: SURGERY

## 2022-04-22 PROCEDURE — 2500000003 HC RX 250 WO HCPCS: Performed by: SPECIALIST

## 2022-04-22 PROCEDURE — 88305 TISSUE EXAM BY PATHOLOGIST: CPT

## 2022-04-22 PROCEDURE — 46930 DESTROY INTERNAL HEMORRHOIDS: CPT | Performed by: SURGERY

## 2022-04-22 PROCEDURE — 3609010400 HC COLONOSCOPY POLYPECTOMY HOT BIOPSY: Performed by: SURGERY

## 2022-04-22 PROCEDURE — 2580000003 HC RX 258: Performed by: ANESTHESIOLOGY

## 2022-04-22 PROCEDURE — C1713 ANCHOR/SCREW BN/BN,TIS/BN: HCPCS | Performed by: SURGERY

## 2022-04-22 PROCEDURE — 3700000001 HC ADD 15 MINUTES (ANESTHESIA): Performed by: SURGERY

## 2022-04-22 PROCEDURE — 6360000002 HC RX W HCPCS: Performed by: SPECIALIST

## 2022-04-22 RX ORDER — SODIUM CHLORIDE 0.9 % (FLUSH) 0.9 %
5-40 SYRINGE (ML) INJECTION PRN
Status: DISCONTINUED | OUTPATIENT
Start: 2022-04-22 | End: 2022-04-22 | Stop reason: HOSPADM

## 2022-04-22 RX ORDER — PROPOFOL 10 MG/ML
INJECTION, EMULSION INTRAVENOUS PRN
Status: DISCONTINUED | OUTPATIENT
Start: 2022-04-22 | End: 2022-04-22 | Stop reason: SDUPTHER

## 2022-04-22 RX ORDER — MEPERIDINE HYDROCHLORIDE 50 MG/ML
12.5 INJECTION INTRAMUSCULAR; INTRAVENOUS; SUBCUTANEOUS ONCE
Status: DISCONTINUED | OUTPATIENT
Start: 2022-04-22 | End: 2022-04-22 | Stop reason: HOSPADM

## 2022-04-22 RX ORDER — LIDOCAINE HYDROCHLORIDE 10 MG/ML
INJECTION, SOLUTION EPIDURAL; INFILTRATION; INTRACAUDAL; PERINEURAL PRN
Status: DISCONTINUED | OUTPATIENT
Start: 2022-04-22 | End: 2022-04-22 | Stop reason: SDUPTHER

## 2022-04-22 RX ORDER — SODIUM CHLORIDE, SODIUM LACTATE, POTASSIUM CHLORIDE, CALCIUM CHLORIDE 600; 310; 30; 20 MG/100ML; MG/100ML; MG/100ML; MG/100ML
INJECTION, SOLUTION INTRAVENOUS CONTINUOUS
Status: DISCONTINUED | OUTPATIENT
Start: 2022-04-22 | End: 2022-04-22 | Stop reason: HOSPADM

## 2022-04-22 RX ORDER — SODIUM CHLORIDE 0.9 % (FLUSH) 0.9 %
5-40 SYRINGE (ML) INJECTION EVERY 12 HOURS SCHEDULED
Status: DISCONTINUED | OUTPATIENT
Start: 2022-04-22 | End: 2022-04-22 | Stop reason: HOSPADM

## 2022-04-22 RX ORDER — SODIUM CHLORIDE 9 MG/ML
25 INJECTION, SOLUTION INTRAVENOUS PRN
Status: DISCONTINUED | OUTPATIENT
Start: 2022-04-22 | End: 2022-04-22 | Stop reason: HOSPADM

## 2022-04-22 RX ORDER — PROPOFOL 10 MG/ML
INJECTION, EMULSION INTRAVENOUS CONTINUOUS PRN
Status: DISCONTINUED | OUTPATIENT
Start: 2022-04-22 | End: 2022-04-22 | Stop reason: SDUPTHER

## 2022-04-22 RX ORDER — ONDANSETRON 2 MG/ML
4 INJECTION INTRAMUSCULAR; INTRAVENOUS
Status: DISCONTINUED | OUTPATIENT
Start: 2022-04-22 | End: 2022-04-22 | Stop reason: HOSPADM

## 2022-04-22 RX ORDER — DIPHENHYDRAMINE HYDROCHLORIDE 50 MG/ML
12.5 INJECTION INTRAMUSCULAR; INTRAVENOUS
Status: DISCONTINUED | OUTPATIENT
Start: 2022-04-22 | End: 2022-04-22 | Stop reason: HOSPADM

## 2022-04-22 RX ORDER — SODIUM CHLORIDE 9 MG/ML
INJECTION, SOLUTION INTRAVENOUS CONTINUOUS
Status: DISCONTINUED | OUTPATIENT
Start: 2022-04-22 | End: 2022-04-22 | Stop reason: HOSPADM

## 2022-04-22 RX ORDER — SODIUM CHLORIDE 9 MG/ML
INJECTION, SOLUTION INTRAVENOUS PRN
Status: DISCONTINUED | OUTPATIENT
Start: 2022-04-22 | End: 2022-04-22 | Stop reason: HOSPADM

## 2022-04-22 RX ADMIN — SODIUM CHLORIDE, POTASSIUM CHLORIDE, SODIUM LACTATE AND CALCIUM CHLORIDE: 600; 310; 30; 20 INJECTION, SOLUTION INTRAVENOUS at 10:55

## 2022-04-22 RX ADMIN — LIDOCAINE HYDROCHLORIDE 50 MG: 10 INJECTION, SOLUTION EPIDURAL; INFILTRATION; INTRACAUDAL; PERINEURAL at 11:35

## 2022-04-22 RX ADMIN — PROPOFOL 40 MG: 10 INJECTION, EMULSION INTRAVENOUS at 11:47

## 2022-04-22 RX ADMIN — PROPOFOL 120 MCG/KG/MIN: 10 INJECTION, EMULSION INTRAVENOUS at 11:35

## 2022-04-22 RX ADMIN — PROPOFOL 100 MG: 10 INJECTION, EMULSION INTRAVENOUS at 11:35

## 2022-04-22 RX ADMIN — PROPOFOL 20 MG: 10 INJECTION, EMULSION INTRAVENOUS at 11:52

## 2022-04-22 ASSESSMENT — PULMONARY FUNCTION TESTS
PIF_VALUE: 0

## 2022-04-22 ASSESSMENT — PAIN SCALES - GENERAL
PAINLEVEL_OUTOF10: 0

## 2022-04-22 ASSESSMENT — PAIN - FUNCTIONAL ASSESSMENT: PAIN_FUNCTIONAL_ASSESSMENT: 0-10

## 2022-04-22 NOTE — ANESTHESIA POSTPROCEDURE EVALUATION
POST- ANESTHESIA EVALUATION       Pt Name: Henny Jose  MRN: [id number]  [place]: 1970  Date of evaluation: 4/22/2022  Time:  1:48 PM      /75   Pulse 51   Temp 97 °F (36.1 °C) (Temporal)   Resp 19   Ht 5' 7\" (1.702 m)   Wt 203 lb 4 oz (92.2 kg)   SpO2 98%   BMI 31.83 kg/m²      Consciousness Level  Awake  Cardiopulmonary Status  Stable  Pain Adequately Treated YES  Nausea / Vomiting  NO  Adequate Hydration  YES  Anesthesia Related Complications NONE      Electronically signed by Barbara aTylor MD on 4/22/2022 at 1:48 PM       Department of Anesthesiology  Postprocedure Note    Patient: Henny Jose  MRN: [id number]  YOB: 1970  Date of evaluation: 4/22/2022  Time:  1:48 PM     Procedure Summary     Date: 04/22/22 Room / Location: [address]    Anesthesia Start: 1091 Anesthesia Stop: 7545    Procedure: COLONOSCOPY POLYPECTOMY HOT BIOPSY WITH INTERNAL HEMORRHOID TREATMENT (N/A ) Diagnosis: (K62.5 HISTORY OF BRIGHT RED BLOOD PER RECTUM)    Surgeons: Tiffanie Penn DO Responsible Provider: Barbara Taylor MD    Anesthesia Type: MAC ASA Status: 2          Anesthesia Type: MAC    Kendra Phase I: Kendra Score: 10    Kendra Phase II: Kendra Score: 10    Last vitals: Reviewed and per EMR flowsheets.        Anesthesia Post Evaluation

## 2022-04-22 NOTE — ANESTHESIA PRE PROCEDURE
Department of Anesthesiology  Preprocedure Note       Name:  Lucia Hunter   Age:  54 y.o.  :  1970                                          MRN:  [id number]         Date:  2022      Surgeon: Allyson Huang):  Justin Birmingham DO    Procedure: Procedure(s):  COLONOSCOPY DIAGNOSTIC possible INTERNAL HEMORRHOID TREATMENT    Medications prior to admission:   Prior to Admission medications    Medication Sig Start Date End Date Taking? Authorizing Provider   Sodium Sulfate-Mag Sulfate-KCl (SUTAB) 5652-420-865 MG TABS Follow bowel prep instructions 22   Justin Birmingham DO   ibuprofen (ADVIL;MOTRIN) 800 MG tablet Take 800 mg by mouth as needed    Historical Provider, MD   medical marijuana Inhale into the lungs daily. Historical Provider, MD   oxyCODONE-acetaminophen (PERCOCET) 5-325 MG per tablet Take 1 tablet by mouth daily. Historical Provider, MD   tiZANidine (ZANAFLEX) 4 MG tablet Take 4 mg by mouth every 6 hours as needed Take 1/2 to 1 tablet by mouth two times a day as needed    Historical Provider, MD   docusate sodium (COLACE) 100 MG capsule Take 1 capsule by mouth daily as needed for Constipation 3/17/22   KELTON Solitario - CNP       Current medications:    No current facility-administered medications for this encounter. Allergies:     Allergies   Allergen Reactions    Pear Itching    Hydrocodone-Acetaminophen Other (See Comments) and Itching       Problem List:    Patient Active Problem List   Diagnosis Code    Chronic pain of right knee M25.561, G89.29    Chondromalacia of knee M94.269       Past Medical History:        Diagnosis Date    Knee pain, right     trauma at work 11/15    Medial meniscus tear 2015    Snores     possible apnea but not tested yet       Past Surgical History:        Procedure Laterality Date    KNEE ARTHROSCOPY Right 5/3/16    chondroplasty/synovectomy    ROTATOR CUFF REPAIR      UMBILICAL HERNIA REPAIR  1972    VASECTOMY         Social History:    Social History     Tobacco Use    Smoking status: Former Smoker     Types: Cigarettes     Start date: 1992     Quit date: 2013     Years since quittin.1    Smokeless tobacco: Never Used   Substance Use Topics    Alcohol use: No     Alcohol/week: 0.0 standard drinks                                Counseling given: Not Answered      Vital Signs (Current): There were no vitals filed for this visit. BP Readings from Last 3 Encounters:   22 132/84   19 125/79   10/16/18 122/78       NPO Status:                                                                                 BMI:   Wt Readings from Last 3 Encounters:   22 202 lb (91.6 kg)   22 202 lb (91.6 kg)   19 215 lb (97.5 kg)     There is no height or weight on file to calculate BMI.    CBC:   Lab Results   Component Value Date    WBC 4.2 2016    RBC 4.81 2016    HGB 12.8 2016    HCT 38.8 2016    MCV 80.6 2016    RDW 14.3 2016     2016       CMP:   Lab Results   Component Value Date     2016    K 4.0 2016     2016    CO2 24 2016    BUN 13 2016    CREATININE 1.19 2016    GFRAA >60 2016    LABGLOM >60 2016    GLUCOSE 110 2016    PROT 5.8 2016    CALCIUM 8.5 2016    BILITOT 0.31 2016    ALKPHOS 40 2016    AST 19 2016    ALT 20 2016       POC Tests: No results for input(s): POCGLU, POCNA, POCK, POCCL, POCBUN, POCHEMO, POCHCT in the last 72 hours.     Coags: No results found for: PROTIME, INR, APTT    HCG (If Applicable): No results found for: PREGTESTUR, PREGSERUM, HCG, HCGQUANT     ABGs: No results found for: PHART, PO2ART, JDX4SLD, VLS4JNZ, BEART, L8ONUGRE     Type & Screen (If Applicable):  No results found for: LABABO, LABRH    Drug/Infectious Status (If Applicable):  No results found for: HIV, HEPCAB    COVID-19 Screening (If Applicable): No results found for: COVID19        Anesthesia Evaluation  Patient summary reviewed and Nursing notes reviewed no history of anesthetic complications:   Airway: Mallampati: II  TM distance: >3 FB   Neck ROM: full  Mouth opening: > = 3 FB Dental: normal exam         Pulmonary:Negative Pulmonary ROS and normal exam  breath sounds clear to auscultation                             Cardiovascular:Negative CV ROS            Rhythm: regular  Rate: normal                    Neuro/Psych:   Negative Neuro/Psych ROS              GI/Hepatic/Renal:   (+) bowel prep,           Endo/Other: Negative Endo/Other ROS                    Abdominal:       Abdomen: soft. Vascular: negative vascular ROS. Other Findings:           Anesthesia Plan      MAC     ASA 2             Anesthetic plan and risks discussed with patient. Plan discussed with CRNA.                   Moon Candelario MD   4/22/2022

## 2022-04-22 NOTE — H&P
[address]      Patient's Name/ Date of Birth/ Gender: Luli Hercules / 1970 (54 y.o.) / male     Attending physician: Madeleine Morrissey DO    CC: h/o BRBPR    History of present Illness: Luli Hercules is a 54 y.o. male, presents today for colonoscopy for investigation into BRBPR    Colonoscopy history: No prior cscope. Past Medical History:  has a past medical history of Knee pain, right, Medial meniscus tear, and Snores. Past Surgical History:   Past Surgical History:   Procedure Laterality Date    KNEE ARTHROSCOPY Right 5/3/16    chondroplasty/synovectomy    ROTATOR CUFF REPAIR      UMBILICAL HERNIA REPAIR  1972    VASECTOMY         Social History:  reports that he quit smoking about 9 years ago. His smoking use included cigarettes. He started smoking about 30 years ago. He has never used smokeless tobacco. He reports current drug use. Drug: Marijuana Milagro Dinning). He reports that he does not drink alcohol. Family History: family history is not on file. Review of Systems:   General: Denies fever, chills, night sweats, weight loss, malaise, fatigue  HEENT: Denies sore throat, sinus problems, allergic rhinosinusitis  Card: Denies chest pain, palpitations, orthopnea/PND. Denies h/o murmurs  Pulm: Denies cough, shortness of breath, MORSE  GI:  per HPI; denies history of constipation, diarrhea, hematochezia or melena  : Denies polyuria, dysuria, hematuria  Endo: Denies diabetes, thyroid problems. Heme: Denies anemia, h/o bleeding or clotting problems. Neuro: Denies h/o CVA, TIA  Skin: Denies rashes, ulcers  Musculoskeletal: Denies muscle, joint, back pain. Allergies: Pear and Hydrocodone-acetaminophen    Current Meds:No current facility-administered medications for this encounter.     Physical Exam:  Vital signs and Nurse's note reviewed  Gen:  A&Ox3, NAD  HEENT: NCAT, PERRLA, EOMI, no scleral icterus, oral mucosa moist  Neck: Trachea midline without obvious masses or lesions  Chest: Symmetric rise with inhalation, no evidence of trauma  CVS: S1S2  Resp: Good bilateral air entry, no audible wheeze or rhonchi  Abd: soft, non-tender, non-distended, no hepatosplenomegaly or palpable masses  Ext: No clubbing, cyanosis, edema, peripheral pulses 2+ Rad/Fem/DP/PT  CNS: Moves all extremities, no gross focal motor deficits  Skin: No erythema or ulcerations         Assessment:    Nicholas Beckman is a 54 y.o. male presents for colonoscopy for BRBPR    Plan:    1. To OR for colonoscopy possible internal hemorrhoid treatment. The risks include bleeding, infection, scarring, perforation, damage to surrounding tissues, need for further surgery in the future. The benefits, alternatives, complications and procedure details were explained to the patient, all questions were answered.           Miguel Durham DO  4/22/2022

## 2022-04-23 NOTE — OP NOTE
Operative Note      Patient: Arnie Dawson  YOB: 1970  MRN: [id number]    Date of Procedure: 4/22/2022    Pre-Op Diagnosis: K62.5 HISTORY OF BRIGHT RED BLOOD PER RECTUM    Post-Op Diagnosis:   Incomplete bowel prep, no large masses seen  Moderate internal hemorrhoids with erythematous mucosa       Procedure(s):  COLONOSCOPY POLYPECTOMY HOT BIOPSY WITH INTERNAL HEMORRHOID TREATMENT    Surgeon(s):  Tong Castañeda DO    Assistant:   * No surgical staff found *    Anesthesia: Monitor Anesthesia Care    Estimated Blood Loss (mL): none    Complications: None    Specimens:   ID Type Source Tests Collected by Time Destination   A : RECTAL POLYP X 3 Tissue Rectum SURGICAL PATHOLOGY Tong Castañeda DO 4/22/2022 1156        Implants:  * No implants in log *      Drains: * No LDAs found *    Findings: as above    Detailed Description of Procedure:     INDICATIONS FOR PROCEDURE:   The patient is a 54y.o. year old male with history of above preop diagnosis. Colonoscopy with possible biopsy or polypectomy was recommend, the risk, benefits, expected outcome, and alternatives to the procedure were explained. Risks included but are not limited to bleeding, infection, respiratory distress, hypotension, and perforation of the colon. The patient understands and is in agreement. PROCEDURE DETAILS:  Patient was brought to the endoscopy suite and placed in the left lateral recumbent position. A time out was completed verifying correct patient, procedure and equipment. Anesthesia was induced using MAC guidelines. A digital rectal exam was performed. The colonoscope was inserted into the rectum without difficulty and the scope was advanced to the cecum which was identified by anatomy and by palpation. Bowel prep was adequate. The scope was slowly withdrawn visualizing the cecum, ascending colon, transverse colon, descending colon, sigmoid colon and rectum.  The scope was withdrawn to the rectal vault and then retroflexed. The scope was then straightened and removed. Internal hemorrhoids grade 1 and 2, bipolar thermal energy device was used to coagulate the blood supply to the hemorrhoids 1cm above the actual hemorrhoid and above the dentate line, cautery was applied to a maximum temperature of 55 deg Celsius. There were no complications. The patient tolerated the procedure well and was transferred to the recovery unit in stable condition. Findings:    Polyps:    none    Other findings:    Moderate amount of fecal and vegetable matter of the right colon, lesser amount elsewhere. Unable to fully mobilize this amount of stool with irrigation, no large masses seen but smaller masses may have been missed   Grade 1 and 2 internal hemorrhoids with erythematous mucosa, no evidence of infection or active bleeding    Greater than 6 minutes was spent withdrawing the colonoscope. IMPRESSION/PLAN:   1. Increase dietary fiber and water  2. Patient is advised to have a repeat colonoscopy in 1 years due to poor bowel prep, will need two day prep with Golytely  3.  Colonoscopy sooner if blood in the stool, unexplained weight loss, or change in the bowels          Electronically signed by Jean Paul Barnett DO on 4/23/2022 at 7:35 AM

## 2022-04-26 LAB — SURGICAL PATHOLOGY REPORT: NORMAL

## 2022-05-09 ENCOUNTER — OFFICE VISIT (OUTPATIENT)
Dept: NEUROSURGERY | Age: 52
End: 2022-05-09
Payer: COMMERCIAL

## 2022-05-09 VITALS
HEART RATE: 60 BPM | HEIGHT: 67 IN | DIASTOLIC BLOOD PRESSURE: 72 MMHG | WEIGHT: 196 LBS | TEMPERATURE: 97.7 F | SYSTOLIC BLOOD PRESSURE: 112 MMHG | BODY MASS INDEX: 30.76 KG/M2

## 2022-05-09 DIAGNOSIS — M47.22 CERVICAL SPONDYLOSIS WITH RADICULOPATHY: Primary | ICD-10-CM

## 2022-05-09 PROCEDURE — 99203 OFFICE O/P NEW LOW 30 MIN: CPT | Performed by: NURSE PRACTITIONER

## 2022-05-09 NOTE — PROGRESS NOTES
[address] [facility] KAVITHA Knutson  MOB # [address] [facility] [place] [address]  Dept: [phone]    Patient:  Marcela Obando  YOB: 1970  Date: 5/9/22    The patient is a 54 y.o. male who presents today for consult of the following problems:     Chief Complaint   Patient presents with    Neurologic Problem         HPI:     Marcela Obando is a 54 y.o. male on whom neurosurgical consultation was requested by KELTON Arshad CNP for management of neck pain and arm symptoms. Patient is several year history of neck pain with radiation into both upper extremities in nondermatomal distribution. Patient has completed remote physical therapy, as well as pain management interventions. Describes likely radiofrequency ablations that did provide relief for a period of time, but feels that they have worn off. Follows with outside pain specialist, last interventions in November. Patient is maintained on chronic opioid therapy, muscle relaxer, as well as NSAIDs. Pain significantly worsened with range of motion. Denies dropping objects, distinct dexterity issues. No gait instability. History:     Past Medical History:   Diagnosis Date    Knee pain, right     trauma at work 11/15    Medial meniscus tear Nov. 18 2015    Snores     possible apnea but not tested yet     Past Surgical History:   Procedure Laterality Date    COLONOSCOPY  04/22/2022    COLONOSCOPY N/A 4/22/2022    COLONOSCOPY POLYPECTOMY HOT BIOPSY WITH INTERNAL HEMORRHOID TREATMENT performed by Glenn Ansari DO at [address] [place] [address] ARTHROSCOPY Right 5/3/16    chondroplasty/synovectomy    ROTATOR CUFF REPAIR      UMBILICAL HERNIA REPAIR  1972    VASECTOMY       History reviewed. No pertinent family history.   Current Outpatient Medications on File Prior to Visit   Medication Sig Dispense Refill    ibuprofen (ADVIL;MOTRIN) 800 MG tablet Take 800 mg by mouth as needed      oxyCODONE-acetaminophen (PERCOCET) 5-325 MG per tablet Take 1 tablet by mouth daily.  tiZANidine (ZANAFLEX) 4 MG tablet Take 4 mg by mouth every 6 hours as needed Take 1/2 to 1 tablet by mouth two times a day as needed      Sodium Sulfate-Mag Sulfate-KCl (SUTAB) 8061-221-755 MG TABS Follow bowel prep instructions (Patient not taking: Reported on 2022) 24 tablet 0    medical marijuana Inhale into the lungs daily.  docusate sodium (COLACE) 100 MG capsule Take 1 capsule by mouth daily as needed for Constipation (Patient not taking: Reported on 2022) 30 capsule 2     No current facility-administered medications on file prior to visit. Social History     Tobacco Use    Smoking status: Former Smoker     Types: Cigarettes     Start date: 1992     Quit date: 2013     Years since quittin.2    Smokeless tobacco: Never Used   Substance Use Topics    Alcohol use: No     Alcohol/week: 0.0 standard drinks    Drug use: Yes     Types: Marijuana (Weed)       Allergies   Allergen Reactions    Pear Itching    Hydrocodone-Acetaminophen Other (See Comments) and Itching       Review of Systems  Constitutional: Negative for activity change and appetite change. HENT: Negative for ear pain and facial swelling. Eyes: Negative for discharge and itching. Respiratory: Negative for choking and chest tightness. Cardiovascular: Negative for chest pain and leg swelling. Gastrointestinal: Negative for nausea and abdominal pain. Endocrine: Negative for cold intolerance and heat intolerance. Genitourinary: Negative for frequency and flank pain. Musculoskeletal: Negative for myalgias and joint swelling. Skin: Negative for rash and wound. Allergic/Immunologic: Negative for environmental allergies and food allergies. Hematological: Negative for adenopathy. Does not bruise/bleed easily.    Psychiatric/Behavioral: Negative for self-injury. The patient is not nervous/anxious. Physical Exam:      /72 (Site: Left Upper Arm, Position: Sitting, Cuff Size: Large Adult)   Pulse 60   Temp 97.7 °F (36.5 °C)   Ht 5' 7\" (1.702 m)   Wt 196 lb (88.9 kg)   BMI 30.70 kg/m²   Estimated body mass index is 30.7 kg/m² as calculated from the following:    Height as of this encounter: 5' 7\" (1.702 m). Weight as of this encounter: 196 lb (88.9 kg). General:  Jose De Jesus Armijo is a 54y.o. year old male who appears his stated age. HEENT: Normocephalic atraumatic. Neck supple. Chest: regular rate; pulses equal  Abdomen: Soft nontender nondistended. Ext: DP and PT pulses 2+, good cap refill  Neuro    Mentation  Appropriate affect  Registration intact  Orientation intact  Judgment intact to situation    Cranial Nerves:   Pupils equal and reactive to light  Extraocular motion intact  Face and shrug symmetric  Tongue midline  No dysarthria  v1-3 sensation symmetric, masseter tone symmetric  Hearing symmetric and intact    Sensation: Intact    Motor  L deltoid 5/5; R deltoid 5/5  L biceps 5/5; R biceps 5/5  L triceps 5/5; R triceps 5/5  L wrist extension 5/5; R wrist extension 5/5  L intrinsics 5/5; R intrinsics 5/5     L iliopsoas 5/5 , R iliopsoas 5/5  L quadriceps 5/5; R quadriceps 5/5  L Dorsiflexion 5/5; R dorsiflexion 5/5  L Plantarflexion 5/5; R plantarflexion 5/5  L EHL 5/5; R EHL 5/5    Reflexes  L Brachioradialis 2+/4; R brachioradialis 2+/4  L Biceps 2+/4; R Biceps 2+/4  L Triceps 2+/4; R Triceps 2+/4  L Patellar 2+/4: R Patellar 2+/4  L Achilles 2+/4; R Achilles 2+/4    hoffmans L: neg  hoffmans R: neg  Clonus L: neg  Clonus R: neg  Babinski L: neg  Babinski R: neg    Spurlings: No  Lhermittes: No    +TTP bilateral trapezius    Studies Review:     MRI cervical spine 3/24/2022 (images reviewed):   FINDINGS:   BONES/ALIGNMENT: Straightening the cervical spine with mild reversal of   curvature.  No significant spondylolisthesis.  Vertebral body heights appear   preserved.  Marrow signal appears within normal limits.  No evidence of acute   fracture or aggressive appearing osseous lesions.  Mild multilevel   intervertebral disc space narrowing.       SPINAL CORD: No abnormal cord signal is seen.       SOFT TISSUES: No paraspinal mass identified.       Congenital canal stenosis.       C2-C3: Disc bulge.  Mild thecal sac stenosis.  No significant foraminal   narrowing.       C3-C4: Central disc herniation.  Moderate/severe severe thecal sac stenosis. Mild left foraminal narrowing.       C4-C5: Disc bulge.  Uncovertebral hypertrophy.  Moderate thecal sac stenosis. Moderate left foraminal narrowing.       C5-C6: Disc bulge.  Uncovertebral hypertrophy.  Moderate thecal sac stenosis. Mild bilateral foraminal narrowing.       C6-C7: Disc bulge eccentric to the left.  Uncovertebral hypertrophy. Moderate thecal sac stenosis.  Mild right and severe left foraminal narrowing.       C7-T1: No significant thecal sac stenosis or foraminal narrowing. MRI cervical spine 3/17/2020:  Findings:   Cervical vertebral body heights and alignment and signal are normal. Craniocervical junction is normal.   Signal in the cervical spinal cord is normal.   There is reversal of the normal cervical curvature. There are degenerative disc changes at all cervical levels with disc space narrowing and spurs. At C2-3, there is tiny central disc protrusion without spinal stenosis. At C3-4, there is moderate size central disc protrusion/herniation that touches and slightly flattens the anterior mid spinal cord. At C4-5, there is disc bulge and posterior spurs with mild cord flattening. At C5-6, there is degenerative disc disease. There is disc bulge greater towards the right as well as posterior spurring resulting in mild flattening of the right anterior spinal cord.      At C6-7, there is left paracentral and foraminal disc protrusion with mild flattening of the left anterior spinal cord. There is posterior spurring. At C7-T1, no disc herniation or spinal stenosis. Assessment and Plan:      1. Cervical spondylosis with radiculopathy          Plan: Recommend proceeding with updated course of physical therapy. Referral provided. We will obtain outside records from pain management to determine what interventions were previously completed that provided relief for a period of time. Patient to return in 10 to 12 weeks for reevaluation. Followup: Return in about 3 months (around 8/9/2022), or if symptoms worsen or fail to improve. Prescriptions Ordered:  No orders of the defined types were placed in this encounter. Orders Placed:  Orders Placed This Encounter   Procedures   [facility]     Referral Priority:   Routine     Referral Type:   Eval and Treat     Referral Reason:   Specialty Services Required     Requested Specialty:   Physical Therapy     Number of Visits Requested:   1        Electronically signed by KELTON Gauthier CNP on 5/9/2022 at 4:00 PM    Please note that this chart was generated using voice recognition Dragon dictation software. Although every effort was made to ensure the accuracy of this automated transcription, some errors in transcription may have occurred.

## 2022-07-12 ENCOUNTER — APPOINTMENT (OUTPATIENT)
Dept: GENERAL RADIOLOGY | Age: 52
End: 2022-07-12
Payer: COMMERCIAL

## 2022-07-12 ENCOUNTER — APPOINTMENT (OUTPATIENT)
Dept: CT IMAGING | Age: 52
End: 2022-07-12
Payer: COMMERCIAL

## 2022-07-12 ENCOUNTER — HOSPITAL ENCOUNTER (OUTPATIENT)
Age: 52
Setting detail: OBSERVATION
Discharge: HOME | End: 2022-07-13
Attending: EMERGENCY MEDICINE | Admitting: EMERGENCY MEDICINE
Payer: COMMERCIAL

## 2022-07-12 DIAGNOSIS — R07.9 CHEST PAIN, UNSPECIFIED TYPE: Primary | ICD-10-CM

## 2022-07-12 DIAGNOSIS — K59.03 THERAPEUTIC OPIOID-INDUCED CONSTIPATION (OIC): ICD-10-CM

## 2022-07-12 DIAGNOSIS — T40.2X5A THERAPEUTIC OPIOID-INDUCED CONSTIPATION (OIC): ICD-10-CM

## 2022-07-12 LAB
ABSOLUTE EOS #: 0.04 K/UL (ref 0–0.44)
ABSOLUTE IMMATURE GRANULOCYTE: <0.03 K/UL (ref 0–0.3)
ABSOLUTE LYMPH #: 0.99 K/UL (ref 1.1–3.7)
ABSOLUTE MONO #: 0.37 K/UL (ref 0.1–1.2)
ALBUMIN SERPL-MCNC: 4.1 G/DL (ref 3.5–5.2)
ALBUMIN/GLOBULIN RATIO: 2 (ref 1–2.5)
ALP BLD-CCNC: 53 U/L (ref 40–129)
ALT SERPL-CCNC: 12 U/L (ref 5–41)
ANION GAP SERPL CALCULATED.3IONS-SCNC: 7 MMOL/L (ref 9–17)
AST SERPL-CCNC: 17 U/L
BASOPHILS # BLD: 1 % (ref 0–2)
BASOPHILS ABSOLUTE: 0.06 K/UL (ref 0–0.2)
BILIRUB SERPL-MCNC: 0.76 MG/DL (ref 0.3–1.2)
BUN BLDV-MCNC: 8 MG/DL (ref 6–20)
CALCIUM SERPL-MCNC: 9.1 MG/DL (ref 8.6–10.4)
CHLORIDE BLD-SCNC: 107 MMOL/L (ref 98–107)
CO2: 28 MMOL/L (ref 20–31)
CREAT SERPL-MCNC: 1.12 MG/DL (ref 0.7–1.2)
D-DIMER QUANTITATIVE: 0.22 MG/L FEU
EOSINOPHILS RELATIVE PERCENT: 1 % (ref 1–4)
GFR AFRICAN AMERICAN: >60 ML/MIN
GFR NON-AFRICAN AMERICAN: >60 ML/MIN
GFR SERPL CREATININE-BSD FRML MDRD: ABNORMAL ML/MIN/{1.73_M2}
GLUCOSE BLD-MCNC: 105 MG/DL (ref 70–99)
HCT VFR BLD CALC: 41.4 % (ref 40.7–50.3)
HEMOGLOBIN: 14.2 G/DL (ref 13–17)
IMMATURE GRANULOCYTES: 0 %
LIPASE: 18 U/L (ref 13–60)
LYMPHOCYTES # BLD: 22 % (ref 24–43)
MAGNESIUM: 1.6 MG/DL (ref 1.6–2.6)
MCH RBC QN AUTO: 28.9 PG (ref 25.2–33.5)
MCHC RBC AUTO-ENTMCNC: 34.3 G/DL (ref 28.4–34.8)
MCV RBC AUTO: 84.3 FL (ref 82.6–102.9)
MONOCYTES # BLD: 8 % (ref 3–12)
NRBC AUTOMATED: 0 PER 100 WBC
PDW BLD-RTO: 13.1 % (ref 11.8–14.4)
PLATELET # BLD: 280 K/UL (ref 138–453)
PMV BLD AUTO: 11.3 FL (ref 8.1–13.5)
POTASSIUM SERPL-SCNC: 3.4 MMOL/L (ref 3.7–5.3)
RBC # BLD: 4.91 M/UL (ref 4.21–5.77)
SEG NEUTROPHILS: 68 % (ref 36–65)
SEGMENTED NEUTROPHILS ABSOLUTE COUNT: 3.06 K/UL (ref 1.5–8.1)
SODIUM BLD-SCNC: 142 MMOL/L (ref 135–144)
TOTAL PROTEIN: 6.2 G/DL (ref 6.4–8.3)
TROPONIN, HIGH SENSITIVITY: 6 NG/L (ref 0–22)
TROPONIN, HIGH SENSITIVITY: 9 NG/L (ref 0–22)
WBC # BLD: 4.5 K/UL (ref 3.5–11.3)

## 2022-07-12 PROCEDURE — 71045 X-RAY EXAM CHEST 1 VIEW: CPT

## 2022-07-12 PROCEDURE — 83735 ASSAY OF MAGNESIUM: CPT

## 2022-07-12 PROCEDURE — 93970 EXTREMITY STUDY: CPT

## 2022-07-12 PROCEDURE — 93005 ELECTROCARDIOGRAM TRACING: CPT | Performed by: STUDENT IN AN ORGANIZED HEALTH CARE EDUCATION/TRAINING PROGRAM

## 2022-07-12 PROCEDURE — 6370000000 HC RX 637 (ALT 250 FOR IP): Performed by: STUDENT IN AN ORGANIZED HEALTH CARE EDUCATION/TRAINING PROGRAM

## 2022-07-12 PROCEDURE — 99285 EMERGENCY DEPT VISIT HI MDM: CPT

## 2022-07-12 PROCEDURE — 6360000004 HC RX CONTRAST MEDICATION: Performed by: STUDENT IN AN ORGANIZED HEALTH CARE EDUCATION/TRAINING PROGRAM

## 2022-07-12 PROCEDURE — 2580000003 HC RX 258: Performed by: STUDENT IN AN ORGANIZED HEALTH CARE EDUCATION/TRAINING PROGRAM

## 2022-07-12 PROCEDURE — 80053 COMPREHEN METABOLIC PANEL: CPT

## 2022-07-12 PROCEDURE — 96374 THER/PROPH/DIAG INJ IV PUSH: CPT

## 2022-07-12 PROCEDURE — 96375 TX/PRO/DX INJ NEW DRUG ADDON: CPT

## 2022-07-12 PROCEDURE — 74174 CTA ABD&PLVS W/CONTRAST: CPT

## 2022-07-12 PROCEDURE — 83690 ASSAY OF LIPASE: CPT

## 2022-07-12 PROCEDURE — 85379 FIBRIN DEGRADATION QUANT: CPT

## 2022-07-12 PROCEDURE — 84484 ASSAY OF TROPONIN QUANT: CPT

## 2022-07-12 PROCEDURE — 85025 COMPLETE CBC W/AUTO DIFF WBC: CPT

## 2022-07-12 PROCEDURE — 6360000002 HC RX W HCPCS: Performed by: STUDENT IN AN ORGANIZED HEALTH CARE EDUCATION/TRAINING PROGRAM

## 2022-07-12 PROCEDURE — 71275 CT ANGIOGRAPHY CHEST: CPT

## 2022-07-12 RX ORDER — MORPHINE SULFATE 4 MG/ML
2 INJECTION, SOLUTION INTRAMUSCULAR; INTRAVENOUS ONCE
Status: COMPLETED | OUTPATIENT
Start: 2022-07-12 | End: 2022-07-12

## 2022-07-12 RX ORDER — NITROGLYCERIN 0.4 MG/1
0.4 TABLET SUBLINGUAL EVERY 5 MIN PRN
Status: DISCONTINUED | OUTPATIENT
Start: 2022-07-12 | End: 2022-07-13 | Stop reason: HOSPADM

## 2022-07-12 RX ORDER — ONDANSETRON 2 MG/ML
4 INJECTION INTRAMUSCULAR; INTRAVENOUS ONCE
Status: COMPLETED | OUTPATIENT
Start: 2022-07-12 | End: 2022-07-12

## 2022-07-12 RX ORDER — SODIUM CHLORIDE, SODIUM LACTATE, POTASSIUM CHLORIDE, AND CALCIUM CHLORIDE .6; .31; .03; .02 G/100ML; G/100ML; G/100ML; G/100ML
1000 INJECTION, SOLUTION INTRAVENOUS ONCE
Status: COMPLETED | OUTPATIENT
Start: 2022-07-12 | End: 2022-07-13

## 2022-07-12 RX ORDER — FENTANYL CITRATE 50 UG/ML
25 INJECTION, SOLUTION INTRAMUSCULAR; INTRAVENOUS ONCE
Status: COMPLETED | OUTPATIENT
Start: 2022-07-12 | End: 2022-07-12

## 2022-07-12 RX ORDER — ASPIRIN 81 MG/1
324 TABLET, CHEWABLE ORAL ONCE
Status: COMPLETED | OUTPATIENT
Start: 2022-07-12 | End: 2022-07-12

## 2022-07-12 RX ADMIN — IOPAMIDOL 100 ML: 755 INJECTION, SOLUTION INTRAVENOUS at 22:02

## 2022-07-12 RX ADMIN — ONDANSETRON 4 MG: 2 INJECTION INTRAMUSCULAR; INTRAVENOUS at 19:26

## 2022-07-12 RX ADMIN — NITROGLYCERIN 0.4 MG: 0.4 TABLET SUBLINGUAL at 19:26

## 2022-07-12 RX ADMIN — ASPIRIN 324 MG: 81 TABLET, CHEWABLE ORAL at 19:26

## 2022-07-12 RX ADMIN — FENTANYL CITRATE 25 MCG: 50 INJECTION, SOLUTION INTRAMUSCULAR; INTRAVENOUS at 19:26

## 2022-07-12 RX ADMIN — MORPHINE SULFATE 2 MG: 4 INJECTION INTRAVENOUS at 21:42

## 2022-07-12 RX ADMIN — SODIUM CHLORIDE, POTASSIUM CHLORIDE, SODIUM LACTATE AND CALCIUM CHLORIDE 1000 ML: 600; 310; 30; 20 INJECTION, SOLUTION INTRAVENOUS at 19:31

## 2022-07-12 ASSESSMENT — PAIN - FUNCTIONAL ASSESSMENT: PAIN_FUNCTIONAL_ASSESSMENT: 0-10

## 2022-07-12 ASSESSMENT — PAIN SCALES - GENERAL: PAINLEVEL_OUTOF10: 9

## 2022-07-12 ASSESSMENT — PAIN DESCRIPTION - ORIENTATION: ORIENTATION: RIGHT;LEFT

## 2022-07-12 ASSESSMENT — PAIN DESCRIPTION - LOCATION: LOCATION: CHEST;LEG

## 2022-07-12 ASSESSMENT — PAIN DESCRIPTION - FREQUENCY: FREQUENCY: CONTINUOUS

## 2022-07-12 ASSESSMENT — PAIN DESCRIPTION - DESCRIPTORS: DESCRIPTORS: TIGHTNESS

## 2022-07-12 NOTE — ED TRIAGE NOTES
Patient presented to the ED today with complaints if chest/bilateral leg/bilateral thigh pain. Patient states that symptoms presented 3 days ago.

## 2022-07-12 NOTE — ED PROVIDER NOTES
[facility] ED     Emergency Department     Faculty Attestation        I performed a history and physical examination of the patient and discussed management with the resident. I reviewed the residents note and agree with the documented findings and plan of care. Any areas of disagreement are noted on the chart. I was personally present for the key portions of any procedures. I have documented in the chart those procedures where I was not present during the key portions. I have reviewed the emergency nurses triage note. I agree with the chief complaint, past medical history, past surgical history, allergies, medications, social and family history as documented unless otherwise noted below. For Physician Assistant/ Nurse Practitioner cases/documentation I have personally evaluated this patient and have completed at least one if not all key elements of the E/M (history, physical exam, and MDM). Additional findings are as noted. Vital Signs: BP: (!) 173/97  Heart Rate: (!) 136  Resp: 24  Temp: 98.1 °F (36.7 °C) SpO2: 100 %  PCP:  KELTON Bruno CNP    Pertinent Comments:             EKG Interpretation    Interpreted by emergency department physician    Rhythm: normal sinus   Rate: normal and 98 bpm  Axis: normal  Conduction: normal  ST Segments: no acute change  T Waves: no acute change  Q Waves: no acute change    Clinical Impression:  nonspecific EKG. Critical Care  None    This patient was evaluated in the Emergency Department for symptoms described in the history of present illness. He/she was evaluated in the context of the global COVID-19 pandemic, which necessitated consideration that the patient might be at risk for infection with the SARS-CoV-2 virus that causes COVID-19.  Institutional protocols and algorithms that pertain to the evaluation of patients at risk for COVID-19 are in a state of rapid change based on information released by regulatory bodies including the CDC and federal and state organizations. These policies and algorithms were followed during the patient's care in the ED. (Please note that portions of this note were completed with a voice recognition program. Efforts were made to edit the dictations but occasionally words are mis-transcribed.  Whenever words are used in this note in any gender, they shall be construed as though they were used in the gender appropriate to the circumstances; and whenever words are used in this note in the singular or plural form, they shall be construed as though they were used in the form appropriate to the circumstances.)    MD Hortencia Matthews  Attending Emergency Medicine Physician           Yuki Starr MD  07/12/22 1824

## 2022-07-12 NOTE — ED PROVIDER NOTES
[facility] ED  Emergency Department Encounter  EmergencyMedicine Resident     Pt Name:Emre Mojica  MRN: [id number]  Dillongfyesenia 1970  Date of evaluation: 7/12/22  PCP:  KELTON Turner CNP    This patient was evaluated in the Emergency Department for symptoms described in the history of present illness. The patient was evaluated in the context of the global COVID-19 pandemic, which necessitated consideration that the patient might be at risk for infection with the SARS-CoV-2 virus that causes COVID-19. Institutional protocols and algorithms that pertain to the evaluation of patients at risk for COVID-19 are in a state of rapid change based on information released by regulatory bodies including the CDC and federal and state organizations. These policies and algorithms were followed during the patient's care in the ED. CHIEF COMPLAINT       Chief Complaint   Patient presents with    Chest Pain    Leg Pain       HISTORY OF PRESENT ILLNESS  (Location/Symptom, Timing/Onset, Context/Setting, Quality, Duration, Modifying Factors, Severity.)      Zehra Licona is a 54 y.o. male who presents with acute onset chest pain and bilateral leg pain. PAST MEDICAL / SURGICAL / SOCIAL / FAMILY HISTORY      has a past medical history of Knee pain, right, Medial meniscus tear, and Snores. has a past surgical history that includes Umbilical hernia repair (1972); Vasectomy; Knee arthroscopy (Right, 5/3/16); Rotator cuff repair; Colonoscopy (04/22/2022); and Colonoscopy (N/A, 4/22/2022).       Social History     Socioeconomic History    Marital status:      Spouse name: Shama    Number of children: 11    Years of education: Not on file    Highest education level: Not on file   Occupational History    Occupation:      Employer: [employer] Use    Smoking status: Former Smoker     Types: Cigarettes     Start date: 2/25/1992     Quit date: 2/25/2013 Years since quittin.3    Smokeless tobacco: Never Used   Substance and Sexual Activity    Alcohol use: No     Alcohol/week: 0.0 standard drinks    Drug use: Yes     Types: Marijuana Melvina Cristhian)    Sexual activity: Not on file   Other Topics Concern    Not on file   Social History Narrative    Not on file     Social Determinants of Health     Financial Resource Strain:     Difficulty of Paying Living Expenses: Not on file   Food Insecurity:     Worried About Running Out of Food in the Last Year: Not on file    Choco of Food in the Last Year: Not on file   Transportation Needs:     Lack of Transportation (Medical): Not on file    Lack of Transportation (Non-Medical): Not on file   Physical Activity:     Days of Exercise per Week: Not on file    Minutes of Exercise per Session: Not on file   Stress:     Feeling of Stress : Not on file   Social Connections:     Frequency of Communication with Friends and Family: Not on file    Frequency of Social Gatherings with Friends and Family: Not on file    Attends Synagogue Services: Not on file    Active Member of [address] [place] [address] or Organizations: Not on file    Attends Club or Organization Meetings: Not on file    Marital Status: Not on file   Intimate Partner Violence:     Fear of Current or Ex-Partner: Not on file    Emotionally Abused: Not on file    Physically Abused: Not on file    Sexually Abused: Not on file   Housing Stability:     Unable to Pay for Housing in the Last Year: Not on file    Number of [place] in the Last Year: Not on file    Unstable Housing in the Last Year: Not on file       No family history on file. Allergies:  Pear and Hydrocodone-acetaminophen    Home Medications:  Prior to Admission medications    Medication Sig Start Date End Date Taking?  Authorizing Provider   Sodium Sulfate-Mag Sulfate-KCl (SUTAB) 2517-703-600 MG TABS Follow bowel prep instructions  Patient not taking: Reported on 2022   Faye Godinez DO ibuprofen (ADVIL;MOTRIN) 800 MG tablet Take 800 mg by mouth as needed    Historical Provider, MD   medical marijuana Inhale into the lungs daily. Historical Provider, MD   oxyCODONE-acetaminophen (PERCOCET) 5-325 MG per tablet Take 1 tablet by mouth daily. Historical Provider, MD   tiZANidine (ZANAFLEX) 4 MG tablet Take 4 mg by mouth every 6 hours as needed Take 1/2 to 1 tablet by mouth two times a day as needed    Historical Provider, MD   docusate sodium (COLACE) 100 MG capsule Take 1 capsule by mouth daily as needed for Constipation  Patient not taking: Reported on 5/9/2022 3/17/22   Luis Salomon, APRN - CNP       REVIEW OF SYSTEMS    (2-9 systems for level 4, 10 or more for level 5)      Review of Systems   Constitutional: Negative for chills, fatigue and fever. HENT: Negative for congestion and trouble swallowing. Eyes: Negative for visual disturbance. Respiratory: Positive for chest tightness. Negative for cough and shortness of breath. Cardiovascular: Positive for chest pain. Gastrointestinal: Positive for abdominal pain. Negative for diarrhea, nausea and vomiting. Endocrine: Negative for polyuria. Genitourinary: Negative for dysuria, flank pain, hematuria and urgency. Musculoskeletal: Negative for back pain and myalgias. Skin: Negative for color change. Allergic/Immunologic: Negative for immunocompromised state. Neurological: Positive for light-headedness. Negative for dizziness, syncope, weakness and headaches. PHYSICAL EXAM   (up to 7 for level 4, 8 or more for level 5)      INITIAL VITALS:   /70   Pulse 89   Temp 98.1 °F (36.7 °C) (Oral)   Resp 17   Ht 5' 7\" (1.702 m)   Wt 200 lb (90.7 kg)   SpO2 95%   BMI 31.32 kg/m²     Physical Exam  Constitutional:       Appearance: He is well-developed. HENT:      Head: Normocephalic and atraumatic.       Nose: Nose normal.      Mouth/Throat:      Mouth: Mucous membranes are moist.   Eyes: Conjunctiva/sclera: Conjunctivae normal.   Cardiovascular:      Rate and Rhythm: Normal rate and regular rhythm. Heart sounds: Normal heart sounds. No murmur heard. No friction rub. Pulmonary:      Effort: Pulmonary effort is normal. No respiratory distress. Breath sounds: Normal breath sounds. No rales. Chest:      Chest wall: No tenderness. Abdominal:      General: Abdomen is flat. Palpations: Abdomen is soft. There is no hepatomegaly, splenomegaly or pulsatile mass. Tenderness: There is no abdominal tenderness. Hernia: No hernia is present. Musculoskeletal:         General: No swelling. Cervical back: Neck supple. No tenderness. Skin:     General: Skin is warm. Capillary Refill: Capillary refill takes less than 2 seconds. Neurological:      General: No focal deficit present. Mental Status: He is alert. Motor: No weakness.          DIFFERENTIAL  DIAGNOSIS     PLAN (LABS / IMAGING / EKG):  Orders Placed This Encounter   Procedures    VL DUP LOWER EXTREMITY VENOUS BILATERAL    XR CHEST PORTABLE    CTA CHEST W WO CONTRAST    CTA ABDOMEN PELVIS W CONTRAST    Troponin    CBC with Auto Differential    D-Dimer, Quantitative    Comprehensive Metabolic Panel    Lipase    Magnesium    PREVIOUS SPECIMEN    EKG 12 Lead       MEDICATIONS ORDERED:  Orders Placed This Encounter   Medications    fentaNYL (SUBLIMAZE) injection 25 mcg    aspirin chewable tablet 324 mg    ondansetron (ZOFRAN) injection 4 mg    nitroGLYCERIN (NITROSTAT) SL tablet 0.4 mg    lactated ringers bolus    morphine injection 2 mg    iopamidol (ISOVUE-370) 76 % injection 100 mL       DDX: ACS, arrhythmia, trauma, PE, PNA, pneumothroax, esophageal rupture, tamponade, drug use, pneumonia, pericarditis, GERD, MSK, Endocarditis, anxiety         DIAGNOSTIC RESULTS / EMERGENCY DEPARTMENT COURSE / MDM   LAB RESULTS:  Results for orders placed or performed during the hospital encounter of 07/12/22   Troponin   Result Value Ref Range    Troponin, High Sensitivity 6 0 - 22 ng/L   Troponin   Result Value Ref Range    Troponin, High Sensitivity 9 0 - 22 ng/L   CBC with Auto Differential   Result Value Ref Range    WBC 4.5 3.5 - 11.3 k/uL    RBC 4.91 4.21 - 5.77 m/uL    Hemoglobin 14.2 13.0 - 17.0 g/dL    Hematocrit 41.4 40.7 - 50.3 %    MCV 84.3 82.6 - 102.9 fL    MCH 28.9 25.2 - 33.5 pg    MCHC 34.3 28.4 - 34.8 g/dL    RDW 13.1 11.8 - 14.4 %    Platelets 740 973 - 283 k/uL    MPV 11.3 8.1 - 13.5 fL    NRBC Automated 0.0 0.0 per 100 WBC    Seg Neutrophils 68 (H) 36 - 65 %    Lymphocytes 22 (L) 24 - 43 %    Monocytes 8 3 - 12 %    Eosinophils % 1 1 - 4 %    Basophils 1 0 - 2 %    Immature Granulocytes 0 0 %    Segs Absolute 3.06 1.50 - 8.10 k/uL    Absolute Lymph # 0.99 (L) 1.10 - 3.70 k/uL    Absolute Mono # 0.37 0.10 - 1.20 k/uL    Absolute Eos # 0.04 0.00 - 0.44 k/uL    Basophils Absolute 0.06 0.00 - 0.20 k/uL    Absolute Immature Granulocyte <0.03 0.00 - 0.30 k/uL   D-Dimer, Quantitative   Result Value Ref Range    D-Dimer, Quant 0.22 mg/L FEU   Comprehensive Metabolic Panel   Result Value Ref Range    Glucose 105 (H) 70 - 99 mg/dL    BUN 8 6 - 20 mg/dL    CREATININE 1.12 0.70 - 1.20 mg/dL    Calcium 9.1 8.6 - 10.4 mg/dL    Sodium 142 135 - 144 mmol/L    Potassium 3.4 (L) 3.7 - 5.3 mmol/L    Chloride 107 98 - 107 mmol/L    CO2 28 20 - 31 mmol/L    Anion Gap 7 (L) 9 - 17 mmol/L    Alkaline Phosphatase 53 40 - 129 U/L    ALT 12 5 - 41 U/L    AST 17 <40 U/L    Total Bilirubin 0.76 0.3 - 1.2 mg/dL    Total Protein 6.2 (L) 6.4 - 8.3 g/dL    Albumin 4.1 3.5 - 5.2 g/dL    Albumin/Globulin Ratio 2.0 1.0 - 2.5    GFR Non-African American >60 >60 mL/min    GFR African American >60 >60 mL/min    GFR Comment         Lipase   Result Value Ref Range    Lipase 18 13 - 60 U/L   Magnesium   Result Value Ref Range    Magnesium 1.6 1.6 - 2.6 mg/dL         RADIOLOGY:  CTA CHEST W WO CONTRAST    Result Date: 7/12/2022  No acute intra-abdominal or intrapelvic abnormalities are noted. No evidence of thoracic or abdominal aortic aneurysm or dissection. No acute abnormality of the aorta. RECOMMENDATIONS: Based on MIPS measure 405, incidental abd lesions in this patient population do not warrant F/U W/O a documented medical reason. .     XR CHEST PORTABLE    Result Date: 7/12/2022  No acute process. CTA ABDOMEN PELVIS W CONTRAST    Result Date: 7/12/2022  No acute intra-abdominal or intrapelvic abnormalities are noted. No evidence of thoracic or abdominal aortic aneurysm or dissection. No acute abnormality of the aorta. RECOMMENDATIONS: Based on MIPS measure 405, incidental abd lesions in this patient population do not warrant F/U W/O a documented medical reason. .       EKG  No ST elevations or depressions, intervals within normal limits, normal axis, nonacute EKG    EMERGENCY DEPARTMENT COURSE:  ED Course as of 07/13/22 0020   Wed Jul 13, 2022   0020 IMPRESSION:  No acute intra-abdominal or intrapelvic abnormalities are noted.     No evidence of thoracic or abdominal aortic aneurysm or dissection. No acute  abnormality of the aorta. [AF]      ED Course User Index  [AF] Virginia Haley DO         Assessment/Plan: Patient is a 29-year-old male presenting with acute onset chest pain radiating to the back, associated with diaphoresis. Patiently recently multiple stressors in his life including the passing of some loved ones. Patient had negative troponins, negative chest x-ray, heart score of 4. CTA performed to rule out dissection, unremarkable for dissection at this time, no evidence of thoracic or abdominal aortic aneurysm. Patient has not had cardiac work-up previously, will be admitted to observation for stress test/echo and a cardiology evaluation. FINAL IMPRESSION      1.  Chest pain, unspecified type          DISPOSITION / PLAN     DISPOSITION    Admit to observation    PATIENT REFERRED TO:  No follow-up provider specified.     DISCHARGE MEDICATIONS:  New Prescriptions    No medications on file       Yaw Pereira DO  Emergency Medicine Resident    (Please note that portions of thisnote were completed with a voice recognition program.  Efforts were made to edit the dictations but occasionally words are mis-transcribed.)        Carol James DO  Resident  07/13/22 5437

## 2022-07-13 ENCOUNTER — APPOINTMENT (OUTPATIENT)
Dept: NUCLEAR MEDICINE | Age: 52
End: 2022-07-13
Payer: COMMERCIAL

## 2022-07-13 VITALS
SYSTOLIC BLOOD PRESSURE: 131 MMHG | HEART RATE: 55 BPM | WEIGHT: 200 LBS | DIASTOLIC BLOOD PRESSURE: 81 MMHG | TEMPERATURE: 97.7 F | RESPIRATION RATE: 18 BRPM | BODY MASS INDEX: 31.39 KG/M2 | OXYGEN SATURATION: 97 % | HEIGHT: 67 IN

## 2022-07-13 PROBLEM — R07.9 CHEST PAIN: Status: ACTIVE | Noted: 2022-07-13

## 2022-07-13 LAB
ANION GAP SERPL CALCULATED.3IONS-SCNC: 9 MMOL/L (ref 9–17)
BUN BLDV-MCNC: 8 MG/DL (ref 6–20)
CALCIUM SERPL-MCNC: 8.7 MG/DL (ref 8.6–10.4)
CHLORIDE BLD-SCNC: 108 MMOL/L (ref 98–107)
CO2: 25 MMOL/L (ref 20–31)
CREAT SERPL-MCNC: 1.21 MG/DL (ref 0.7–1.2)
EKG ATRIAL RATE: 52 BPM
EKG ATRIAL RATE: 98 BPM
EKG P AXIS: 44 DEGREES
EKG P AXIS: 63 DEGREES
EKG P-R INTERVAL: 130 MS
EKG P-R INTERVAL: 132 MS
EKG Q-T INTERVAL: 342 MS
EKG Q-T INTERVAL: 452 MS
EKG QRS DURATION: 80 MS
EKG QRS DURATION: 84 MS
EKG QTC CALCULATION (BAZETT): 420 MS
EKG QTC CALCULATION (BAZETT): 436 MS
EKG R AXIS: 23 DEGREES
EKG R AXIS: 28 DEGREES
EKG T AXIS: 36 DEGREES
EKG T AXIS: 46 DEGREES
EKG VENTRICULAR RATE: 52 BPM
EKG VENTRICULAR RATE: 98 BPM
GFR AFRICAN AMERICAN: >60 ML/MIN
GFR NON-AFRICAN AMERICAN: >60 ML/MIN
GFR SERPL CREATININE-BSD FRML MDRD: ABNORMAL ML/MIN/{1.73_M2}
GLUCOSE BLD-MCNC: 115 MG/DL (ref 70–99)
HCT VFR BLD CALC: 40.4 % (ref 40.7–50.3)
HEMOGLOBIN: 12.6 G/DL (ref 13–17)
LV EF: 54 %
LVEF MODALITY: NORMAL
MCH RBC QN AUTO: 27.3 PG (ref 25.2–33.5)
MCHC RBC AUTO-ENTMCNC: 31.2 G/DL (ref 28.4–34.8)
MCV RBC AUTO: 87.4 FL (ref 82.6–102.9)
NRBC AUTOMATED: 0 PER 100 WBC
PDW BLD-RTO: 13 % (ref 11.8–14.4)
PLATELET # BLD: 242 K/UL (ref 138–453)
PMV BLD AUTO: 10 FL (ref 8.1–13.5)
POTASSIUM SERPL-SCNC: 3.4 MMOL/L (ref 3.7–5.3)
RBC # BLD: 4.62 M/UL (ref 4.21–5.77)
SARS-COV-2, RAPID: NOT DETECTED
SODIUM BLD-SCNC: 142 MMOL/L (ref 135–144)
SPECIMEN DESCRIPTION: NORMAL
TROPONIN, HIGH SENSITIVITY: 8 NG/L (ref 0–22)
WBC # BLD: 5.3 K/UL (ref 3.5–11.3)

## 2022-07-13 PROCEDURE — 2580000003 HC RX 258: Performed by: STUDENT IN AN ORGANIZED HEALTH CARE EDUCATION/TRAINING PROGRAM

## 2022-07-13 PROCEDURE — 85027 COMPLETE CBC AUTOMATED: CPT

## 2022-07-13 PROCEDURE — 6360000002 HC RX W HCPCS: Performed by: STUDENT IN AN ORGANIZED HEALTH CARE EDUCATION/TRAINING PROGRAM

## 2022-07-13 PROCEDURE — A9500 TC99M SESTAMIBI: HCPCS | Performed by: EMERGENCY MEDICINE

## 2022-07-13 PROCEDURE — 3430000000 HC RX DIAGNOSTIC RADIOPHARMACEUTICAL: Performed by: EMERGENCY MEDICINE

## 2022-07-13 PROCEDURE — G0378 HOSPITAL OBSERVATION PER HR: HCPCS

## 2022-07-13 PROCEDURE — 2580000003 HC RX 258: Performed by: EMERGENCY MEDICINE

## 2022-07-13 PROCEDURE — 93017 CV STRESS TEST TRACING ONLY: CPT

## 2022-07-13 PROCEDURE — 87635 SARS-COV-2 COVID-19 AMP PRB: CPT

## 2022-07-13 PROCEDURE — 6360000002 HC RX W HCPCS: Performed by: EMERGENCY MEDICINE

## 2022-07-13 PROCEDURE — 84484 ASSAY OF TROPONIN QUANT: CPT

## 2022-07-13 PROCEDURE — 96372 THER/PROPH/DIAG INJ SC/IM: CPT

## 2022-07-13 PROCEDURE — 78452 HT MUSCLE IMAGE SPECT MULT: CPT

## 2022-07-13 PROCEDURE — 80048 BASIC METABOLIC PNL TOTAL CA: CPT

## 2022-07-13 PROCEDURE — 6370000000 HC RX 637 (ALT 250 FOR IP): Performed by: STUDENT IN AN ORGANIZED HEALTH CARE EDUCATION/TRAINING PROGRAM

## 2022-07-13 PROCEDURE — 93005 ELECTROCARDIOGRAM TRACING: CPT | Performed by: EMERGENCY MEDICINE

## 2022-07-13 PROCEDURE — 36415 COLL VENOUS BLD VENIPUNCTURE: CPT

## 2022-07-13 RX ORDER — SODIUM CHLORIDE 9 MG/ML
INJECTION, SOLUTION INTRAVENOUS PRN
Status: DISCONTINUED | OUTPATIENT
Start: 2022-07-13 | End: 2022-07-13 | Stop reason: HOSPADM

## 2022-07-13 RX ORDER — SODIUM CHLORIDE 0.9 % (FLUSH) 0.9 %
10 SYRINGE (ML) INJECTION PRN
Status: DISCONTINUED | OUTPATIENT
Start: 2022-07-13 | End: 2022-07-13 | Stop reason: HOSPADM

## 2022-07-13 RX ORDER — SODIUM CHLORIDE 9 MG/ML
INJECTION, SOLUTION INTRAVENOUS CONTINUOUS
Status: DISCONTINUED | OUTPATIENT
Start: 2022-07-13 | End: 2022-07-13 | Stop reason: HOSPADM

## 2022-07-13 RX ORDER — SODIUM CHLORIDE 9 MG/ML
500 INJECTION, SOLUTION INTRAVENOUS CONTINUOUS PRN
Status: DISCONTINUED | OUTPATIENT
Start: 2022-07-13 | End: 2022-07-13 | Stop reason: ALTCHOICE

## 2022-07-13 RX ORDER — SODIUM CHLORIDE 0.9 % (FLUSH) 0.9 %
5-40 SYRINGE (ML) INJECTION PRN
Status: DISCONTINUED | OUTPATIENT
Start: 2022-07-13 | End: 2022-07-13 | Stop reason: HOSPADM

## 2022-07-13 RX ORDER — ALBUTEROL SULFATE 90 UG/1
2 AEROSOL, METERED RESPIRATORY (INHALATION) PRN
Status: DISCONTINUED | OUTPATIENT
Start: 2022-07-13 | End: 2022-07-13 | Stop reason: ALTCHOICE

## 2022-07-13 RX ORDER — SODIUM CHLORIDE 0.9 % (FLUSH) 0.9 %
5-40 SYRINGE (ML) INJECTION PRN
Status: DISCONTINUED | OUTPATIENT
Start: 2022-07-13 | End: 2022-07-13 | Stop reason: ALTCHOICE

## 2022-07-13 RX ORDER — ENOXAPARIN SODIUM 100 MG/ML
40 INJECTION SUBCUTANEOUS DAILY
Status: DISCONTINUED | OUTPATIENT
Start: 2022-07-13 | End: 2022-07-13 | Stop reason: HOSPADM

## 2022-07-13 RX ORDER — ONDANSETRON 2 MG/ML
4 INJECTION INTRAMUSCULAR; INTRAVENOUS EVERY 6 HOURS PRN
Status: DISCONTINUED | OUTPATIENT
Start: 2022-07-13 | End: 2022-07-13 | Stop reason: HOSPADM

## 2022-07-13 RX ORDER — CHOLECALCIFEROL (VITAMIN D3) 125 MCG
5 CAPSULE ORAL NIGHTLY PRN
Status: DISCONTINUED | OUTPATIENT
Start: 2022-07-13 | End: 2022-07-13 | Stop reason: HOSPADM

## 2022-07-13 RX ORDER — AMINOPHYLLINE DIHYDRATE 25 MG/ML
50 INJECTION, SOLUTION INTRAVENOUS PRN
Status: DISCONTINUED | OUTPATIENT
Start: 2022-07-13 | End: 2022-07-13 | Stop reason: ALTCHOICE

## 2022-07-13 RX ORDER — ONDANSETRON 4 MG/1
4 TABLET, ORALLY DISINTEGRATING ORAL EVERY 8 HOURS PRN
Status: DISCONTINUED | OUTPATIENT
Start: 2022-07-13 | End: 2022-07-13 | Stop reason: HOSPADM

## 2022-07-13 RX ORDER — ATROPINE SULFATE 0.1 MG/ML
0.5 INJECTION INTRAVENOUS EVERY 5 MIN PRN
Status: DISCONTINUED | OUTPATIENT
Start: 2022-07-13 | End: 2022-07-13 | Stop reason: ALTCHOICE

## 2022-07-13 RX ORDER — NITROGLYCERIN 0.4 MG/1
0.4 TABLET SUBLINGUAL EVERY 5 MIN PRN
Status: DISCONTINUED | OUTPATIENT
Start: 2022-07-13 | End: 2022-07-13 | Stop reason: ALTCHOICE

## 2022-07-13 RX ORDER — METOPROLOL TARTRATE 5 MG/5ML
5 INJECTION INTRAVENOUS EVERY 5 MIN PRN
Status: DISCONTINUED | OUTPATIENT
Start: 2022-07-13 | End: 2022-07-13 | Stop reason: ALTCHOICE

## 2022-07-13 RX ORDER — SODIUM CHLORIDE 0.9 % (FLUSH) 0.9 %
5-40 SYRINGE (ML) INJECTION EVERY 12 HOURS SCHEDULED
Status: DISCONTINUED | OUTPATIENT
Start: 2022-07-13 | End: 2022-07-13 | Stop reason: HOSPADM

## 2022-07-13 RX ORDER — DOCUSATE SODIUM 100 MG/1
100 CAPSULE, LIQUID FILLED ORAL DAILY PRN
Qty: 30 CAPSULE | Refills: 2 | Status: SHIPPED | OUTPATIENT
Start: 2022-07-13 | End: 2022-09-15

## 2022-07-13 RX ADMIN — TETRAKIS(2-METHOXYISOBUTYLISOCYANIDE)COPPER(I) TETRAFLUOROBORATE 14.5 MILLICURIE: 1 INJECTION, POWDER, LYOPHILIZED, FOR SOLUTION INTRAVENOUS at 08:20

## 2022-07-13 RX ADMIN — SODIUM CHLORIDE, PRESERVATIVE FREE 10 ML: 5 INJECTION INTRAVENOUS at 11:27

## 2022-07-13 RX ADMIN — SODIUM CHLORIDE: 9 INJECTION, SOLUTION INTRAVENOUS at 01:26

## 2022-07-13 RX ADMIN — Medication 5 MG: at 02:21

## 2022-07-13 RX ADMIN — REGADENOSON 0.4 MG: 0.08 INJECTION, SOLUTION INTRAVENOUS at 11:25

## 2022-07-13 RX ADMIN — SODIUM CHLORIDE, PRESERVATIVE FREE 10 ML: 5 INJECTION INTRAVENOUS at 08:20

## 2022-07-13 RX ADMIN — TETRAKIS(2-METHOXYISOBUTYLISOCYANIDE)COPPER(I) TETRAFLUOROBORATE 35 MILLICURIE: 1 INJECTION, POWDER, LYOPHILIZED, FOR SOLUTION INTRAVENOUS at 11:27

## 2022-07-13 RX ADMIN — ENOXAPARIN SODIUM 40 MG: 100 INJECTION SUBCUTANEOUS at 08:24

## 2022-07-13 RX ADMIN — SODIUM CHLORIDE, PRESERVATIVE FREE 10 ML: 5 INJECTION INTRAVENOUS at 11:25

## 2022-07-13 ASSESSMENT — ENCOUNTER SYMPTOMS
VOMITING: 0
CHEST TIGHTNESS: 1
TROUBLE SWALLOWING: 0
COLOR CHANGE: 0
ABDOMINAL PAIN: 1
DIARRHEA: 0
BACK PAIN: 0
COUGH: 0
NAUSEA: 0
SHORTNESS OF BREATH: 0

## 2022-07-13 ASSESSMENT — PAIN SCALES - GENERAL: PAINLEVEL_OUTOF10: 5

## 2022-07-13 NOTE — FLOWSHEET NOTE
SPIRITUAL CARE DEPARTMENT - [address] 85  PROGRESS NOTE    Shift date: 7.12.2022  Shift day: Tuesday   Shift # 2    Room # 16/16   Name: Maria Dolores Gale                Rastafarian: unknown   Place of Buddhism: unknown    Referral: Routine Visit    Admit Date & Time: 7/12/2022  6:40 PM    Assessment:  Maria Dolores Gale is a 54 y.o. male in the hospital. Upon entering the room writer observes family to be calm and coping. Wants to know when patient will be moved to a room. Intervention:  Writer introduced self and title as  Writer offered space for family  to express feelings, needs, and concerns and provided a ministry presence.  attempted to provide an update to family. Determined support to be available. Outcome:  Patient calm and coping. Plan:  Chaplains will remain available to offer spiritual and emotional support as needed.       Electronically signed by Abigail Hargrove on 7/13/2022 at 12:59 AM.  [facility]  [phone]         07/13/22 8322   Encounter Summary   Service Provided For: Patient   Referral/Consult From: [address] [facility] Family members   Last Encounter  07/12/22   Complexity of Encounter Moderate   Begin Time 2145   End Time  2200   Total Time Calculated 15 min   Encounter    Type Initial Screen/Assessment   Assessment/Intervention/Outcome   Assessment Calm;Coping   Intervention Explored/Affirmed feelings, thoughts, concerns   Outcome Coping     Electronically signed by Brea Fierro on 7/13/2022 at 12:59 AM

## 2022-07-13 NOTE — H&P
[facility]  CDU / OBSERVATION eNCOUnter  Resident Note     Pt Name: Temi Bundy  MRN: [id number]  [place] 1970  Date of evaluation: 7/13/22  Patient's PCP is :  KELTON Day - CNP    CHIEF COMPLAINT       Chief Complaint   Patient presents with    Chest Pain    Leg Pain         HISTORY OF PRESENT ILLNESS    Temi Bundy is a 54 y.o. male who presents with chest pain that radiates to his back. Patient does not have a history of coronary artery disease, is not on any blood thinners, has never had a stent. Patient was not nauseous or vomiting, his chest pain started suddenly, was epigastric to substernal in etiology. Pain is not pleuritic, patient had an episode of diaphoresis with his chest pain. Patient additionally stated that he had associated abdominal pain. Patient did not take anything for his chest pain. Patient has not had a recent cardiac work-up. EKG in the ER was unremarkable for ST segment elevations or depressions. No signs of ischemia or obvious T wave inversions were noted in the ED. Patient was hemodynamically stable in the ED, patient was admitted to [facility] for cardiac work-up. Of note the patient has had some significant life stressors, recent passing of loved ones, patient is tearful along with wife at bedside. Location/Symptom: Chest pain  Timing/Onset: Less than 24 hours  Provocation: Unclear  Quality: Sharp  Radiation:  To the back  Severity: Moderate  Timing/Duration: Constant  Modifying Factors: Unclear    REVIEW OF SYSTEMS       General ROS - No fevers, No malaise   Ophthalmic ROS - No discharge, No changes in vision  ENT ROS -  No sore throat, No rhinorrhea,   Respiratory ROS - no shortness of breath, no cough, no  wheezing  Cardiovascular ROS - + chest pain, no dyspnea on exertion  Gastrointestinal ROS - + abdominal pain, no nausea or vomiting, no change in bowel habits, no black or bloody stools  Genito-Urinary ROS - No dysuria, trouble voiding, or hematuria  Musculoskeletal ROS - No myalgias, No arthalgias  Neurological ROS - No headache, no dizziness/lightheadedness, No focal weakness, no loss of sensation  Dermatological ROS - No lesions, No rash     (PQRS) Advance directives on face sheet per hospital policy. No change unless specifically mentioned in chart    PAST MEDICAL HISTORY    has a past medical history of Knee pain, right, Medial meniscus tear, and Snores. I have reviewed the past medical history with the patient and it is pertinent to this complaint. SURGICAL HISTORY      has a past surgical history that includes Umbilical hernia repair (1972); Vasectomy; Knee arthroscopy (Right, 5/3/16); Rotator cuff repair; Colonoscopy (04/22/2022); and Colonoscopy (N/A, 4/22/2022). I have reviewed and agree with Surgical History entered and it is pertinent to this complaint. CURRENT MEDICATIONS     sodium chloride flush 0.9 % injection 5-40 mL, 2 times per day  sodium chloride flush 0.9 % injection 5-40 mL, PRN  0.9 % sodium chloride infusion, PRN  enoxaparin (LOVENOX) injection 40 mg, Daily  ondansetron (ZOFRAN-ODT) disintegrating tablet 4 mg, Q8H PRN   Or  ondansetron (ZOFRAN) injection 4 mg, Q6H PRN  0.9 % sodium chloride infusion, Continuous  melatonin tablet 5 mg, Nightly PRN  nitroGLYCERIN (NITROSTAT) SL tablet 0.4 mg, Q5 Min PRN        All medication charted and reviewed. ALLERGIES     is allergic to pear and hydrocodone-acetaminophen. FAMILY HISTORY     He indicated that his mother is alive. He indicated that his father is alive. He indicated that his sister is alive. He indicated that his brother is alive. family history is not on file. The patient denies any pertinent family history. I have reviewed and agree with the family history entered. I have reviewed the Family History and it is not significant to the case    SOCIAL HISTORY      reports that he quit smoking about 9 years ago.  His smoking use included cigarettes. He started smoking about 30 years ago. He has never used smokeless tobacco. He reports current drug use. Drug: Marijuana [place]). He reports that he does not drink alcohol. I have reviewed and agree with all Social.  There are no concerns for substance abuse/use. PHYSICAL EXAM     INITIAL VITALS:  height is 5' 7\" (1.702 m) and weight is 200 lb (90.7 kg). His oral temperature is 97.9 °F (36.6 °C). His blood pressure is 121/77 and his pulse is 50. His respiration is 18 and oxygen saturation is 99%. CONSTITUTIONAL: AOx4, no apparent distress, appears stated age    HEAD: normocephalic, atraumatic   EYES: PERRLA, EOMI    ENT: moist mucous membranes, uvula midline   NECK: supple, symmetric   BACK: symmetric   LUNGS: clear to auscultation bilaterally   CARDIOVASCULAR: regular rate and rhythm, no murmurs, rubs or gallops   ABDOMEN: soft, non-tender, non-distended with normal active bowel sounds   NEUROLOGIC:  MAEx4, no focal sensory or motor deficits   MUSCULOSKELETAL: no clubbing, cyanosis or edema   SKIN: no rash or wounds       DIFFERENTIAL DIAGNOSIS/MDM:   ACS, arrhythmia, trauma, PE, PNA, pneumothroax, esophageal rupture, tamponade, drug use, pneumonia, pericarditis, GERD, MSK, Endocarditis, anxiety           DIAGNOSTIC RESULTS     EKG: No ST elevations or depressions, intervals within normal limits, no evidence of tachycardia, no overt T wave inversions, nonacute EKG. Deangelo Walker DO        RADIOLOGY:   I directly visualized the following  images and reviewed the radiologist interpretations:    CTA CHEST W WO CONTRAST    Result Date: 7/12/2022  EXAMINATION: CTA OF THE CHEST WITH AND WITHOUT CONTRAST; CTA OF THE ABDOMEN AND PELVIS WITH CONTRAST 7/12/2022 7:02 pm TECHNIQUE: CTA of the chest was performed before and after the administration of intravenous contrast.  Multiplanar reformatted images are provided for review. MIP images are provided for review.  Automated exposure control, iterative reconstruction, and/or weight based adjustment of the mA/kV was utilized to reduce the radiation dose to as low as reasonably achievable.; CTA of the abdomen and pelvis was performed with the administration of intravenous contrast. Multiplanar reformatted images are provided for review. MIP images are provided for review. Automated exposure control, iterative reconstruction, and/or weight based adjustment of the mA/kV was utilized to reduce the radiation dose to as low as reasonably achievable. COMPARISON: None. HISTORY: ORDERING SYSTEM PROVIDED HISTORY: eval for dissection, AAA TECHNOLOGIST PROVIDED HISTORY: eval for dissection, AAA; ORDERING SYSTEM PROVIDED HISTORY: r/o dissection TECHNOLOGIST PROVIDED HISTORY: r/o dissection Decision Support Exception - unselect if not a suspected or confirmed emergency medical condition->Emergency Medical Condition (MA) CTA CHEST FINDINGS: Aorta: No evidence of thoracic aortic aneurysm or dissection. No acute abnormality of the aorta. Mediastinum: No evidence of mediastinal lymphadenopathy. The heart and pericardium demonstrate no acute abnormality. No evidence of pulmonary embolism Lungs/Pleura: The lungs are without acute process. No focal consolidation or pulmonary edema. No evidence of pleural effusion or pneumothorax. Soft Tissues/Bones: No acute bone or soft tissue abnormality. CTA ABDOMEN AND PELVIS FINDINGS: Vasculature: . No evidence of abdominal aortic dissection or aneurysm. Celiac axis and major branches are patent. Both renal arteries are patent and appear normal. SMA and visualized GUANAKO are patent. Visualized bilateral iliac arteries are of normal caliber without significant stenosis. Organs: Liver is normal in size and density. No focal masses identified. No evidence of intrahepatic ductal dilatation. Spleen is normal size. The gallbladder is unremarkable. Both adrenal glands are normal.  Pancreas is normal in appearance.  Simple cyst in the left kidney. .  The kidneys are otherwise normal in size and attenuation without evidence of hydronephrosis or renal calculi. GI/Bowel: The visualized bowel and mesentery show no mass lesions. Mild colonic diverticulosis. No evidence of diverticulitis. Normal appendix. Pelvis: No intrapelvic mass is identified. Bladder and rectum are intact. Mild prostatic enlargement Peritoneum/Retroperitoneum: No free fluid. No lymphadenopathy. No evidence of pneumoperitoneum. Bones/Soft Tissues: Small fat containing umbilical hernia. .  No acute bony abnormalities. Postsurgical changes in the scrotal     No acute intra-abdominal or intrapelvic abnormalities are noted. No evidence of thoracic or abdominal aortic aneurysm or dissection. No acute abnormality of the aorta. RECOMMENDATIONS: Based on MIPS measure 405, incidental abd lesions in this patient population do not warrant F/U W/O a documented medical reason. .     XR CHEST PORTABLE    Result Date: 7/12/2022  EXAMINATION: ONE XRAY VIEW OF THE CHEST 7/12/2022 5:32 pm COMPARISON: 02/25/2016 HISTORY: ORDERING SYSTEM PROVIDED HISTORY: chest pain TECHNOLOGIST PROVIDED HISTORY: chest pain Reason for Exam: upr,chest and leg pain FINDINGS: The lungs are without acute focal process. There is no effusion or pneumothorax. The cardiomediastinal silhouette is stable. The osseous structures are stable. No acute process. CTA ABDOMEN PELVIS W CONTRAST    Result Date: 7/12/2022  EXAMINATION: CTA OF THE CHEST WITH AND WITHOUT CONTRAST; CTA OF THE ABDOMEN AND PELVIS WITH CONTRAST 7/12/2022 7:02 pm TECHNIQUE: CTA of the chest was performed before and after the administration of intravenous contrast.  Multiplanar reformatted images are provided for review. MIP images are provided for review.  Automated exposure control, iterative reconstruction, and/or weight based adjustment of the mA/kV was utilized to reduce the radiation dose to as low as reasonably achievable.; CTA of the mesentery show no mass lesions. Mild colonic diverticulosis. No evidence of diverticulitis. Normal appendix. Pelvis: No intrapelvic mass is identified. Bladder and rectum are intact. Mild prostatic enlargement Peritoneum/Retroperitoneum: No free fluid. No lymphadenopathy. No evidence of pneumoperitoneum. Bones/Soft Tissues: Small fat containing umbilical hernia. .  No acute bony abnormalities. Postsurgical changes in the scrotal     No acute intra-abdominal or intrapelvic abnormalities are noted. No evidence of thoracic or abdominal aortic aneurysm or dissection. No acute abnormality of the aorta. RECOMMENDATIONS: Based on MIPS measure 405, incidental abd lesions in this patient population do not warrant F/U W/O a documented medical reason. Madelyn Loaiza LABS:  I have reviewed and interpreted all available lab results.   Labs Reviewed   CBC WITH AUTO DIFFERENTIAL - Abnormal; Notable for the following components:       Result Value    Seg Neutrophils 68 (*)     Lymphocytes 22 (*)     Absolute Lymph # 0.99 (*)     All other components within normal limits   COMPREHENSIVE METABOLIC PANEL - Abnormal; Notable for the following components:    Glucose 105 (*)     Potassium 3.4 (*)     Anion Gap 7 (*)     Total Protein 6.2 (*)     All other components within normal limits   COVID-19, RAPID   TROPONIN   TROPONIN   D-DIMER, QUANTITATIVE   LIPASE   MAGNESIUM   PREVIOUS SPECIMEN   TROPONIN   CBC   BASIC METABOLIC PANEL       SCREENING TOOLS:    HEART Risk Score for Chest Pain Patients   History and Physical Exam Suspicion Level  (Nausea, Vomiting, Diaphoresis, Radiation, Exertion)   Slightly Suspicious (0 pts)   Moderately Suspicious (1 pt)   Highly Suspicious (2 pts)   EKG Interpretation   Normal (0 pts)   Non-Specific Repolarization Disturbance (1 pt)   Significant ST-Depression (2 pts)   Age of Patient (in years)   = 45 (0 pts)   46-64 (1 pt)   = 65 (2 pts)   Risk Factors   No Risk Factors (0 pts)   1-2 Risk Factors (1 pt)   = 3 Risk Factors (2 pts)   Risk Factors Include:   Hypercholesterolemia   Hypertension   Diabetes Mellitus   Cigarette smoking   Positive family history   Obesity   CAD   (SLE, CKDz, HIV, Cocaine abuse)   Troponin Levels   = Normal Limit (0 pts)   1-3 Times Normal Limit (1 pt)   > 3 Times Normal Limit (2 pts)  TOTAL:    Percent Risk for Major Adverse Cardiac Event (MACE)  0-3 pts indicates low risk for MACE   2.5% (DISCHARGE)   4-7 pts indicates moderate risk for MACE  20.3% (OBS)  8-10 pts indicates high risk for MACE  72.7% (EARLY INVASIVE TX)    CDU IMPRESSION / Bishnu Urena is a 54 y.o. male who presents with chest pain radiating to the back. Patient was evaluated in the ER, troponin was negative, CT angiogram to rule out dissection and aneurysm, results were negative in the ED. Patient was admitted to the observation unit for cardiology evaluation work-up in the morning, repeat troponins. If work-up is negative, patient may be discharged today. 1.  Chest pain  · CTA negative for dissection, negative for aneurysm  · Troponins negative  · Chest x-ray unremarkable  · Pending cardiology evaluation, likely stress test versus echocardiogram, if negative can discharge with outpatient follow-up. · Continue home medications and pain control  · Monitor vitals, labs, and imaging  · DISPO: pending consults and clinical improvement    CONSULTS:    IP CONSULT TO CARDIOLOGY    PROCEDURES:  Not indicated       PATIENT REFERRED TO:    No follow-up provider specified. --  Kathy Parks, DO   Emergency Medicine Resident     This dictation was generated by voice recognition computer software. Although all attempts are made to edit the dictation for accuracy, there may be errors in the transcription that are not intended.

## 2022-07-13 NOTE — PROCEDURES
[address]                  [facility]                              CARDIAC STRESS TEST    PATIENT NAME: Deloris Pearce               :        1970  MED REC NO:   [id number]                             ROOM:       0499  ACCOUNT NO:   [de-identified]                           ADMIT DATE: 2022  PROVIDER:     Janis Martinez    DATE OF STUDY:  2022    ORDERING PROVIDER: Dr. Idania Kennedy PROVIDER: FLAKO Nunn    INTERPRETING PHYSICIAN: Dr. Wolf Piña:    Indication: Chest pain    Procedure explained and consent signed. Medications: Lexiscan, 0.4 mg  Resting Heart rate: 46 bpm  Resting blood pressure:  170/94 mm/Hg  Infusion heart rate:  103 bpm  Infusion blood pressure:  144/79 mm/Hg  Resting EKG: Sinus bradycardia, otherwise normal  Stress heart response: Normal Response  Stress BP response: Appropriate  Stress EKG(S): No changes seen  Chest discomfort: 8/10  Ischemic EKG changes: None    IMPRESSION:  Electrocardiographically Negative Lexiscan stress study. Radio-isotope  results to follow from the department of Nuclear Medicine.        [facility]    D: 2022 13:41:50       T: 2022 13:42:33     /[id number]  Job#: [id number]     Doc#: Unknown    CC:

## 2022-07-13 NOTE — DISCHARGE SUMMARY
CDU Discharge Summary        Patient:  Zoie Phillips  YOB: 1970    MRN: [id number]   Acct: [de-identified]    Primary Care Physician: KELTON Finley CNP    Admit date:  7/12/2022  6:40 PM  Discharge date: 7/13/2022  3:33 PM     Discharge Diagnoses:     Acute chest pain due to unclear etiology  Improved with conservative pain management    Follow-up:  Call today/tomorrow for a follow up appointment with KELTON Finley CNP , or return to the Emergency Room with worsening symptoms    Stressed to patient the importance of following up with primary care doctor for further workup/management of symptoms. Pt verbalizes understanding and agrees with plan. Discharge Medications:  Changes to medications            Medication List      CONTINUE taking these medications    docusate sodium 100 MG capsule  Commonly known as: COLACE  Take 1 capsule by mouth daily as needed for Constipation     ibuprofen 800 MG tablet  Commonly known as: ADVIL;MOTRIN     medical marijuana        STOP taking these medications    oxyCODONE-acetaminophen 5-325 MG per tablet  Commonly known as: PERCOCET     Sutab [id number] MG Tabs  Generic drug: Sodium Sulfate-Mag Sulfate-KCl     tiZANidine 4 MG tablet  Commonly known as: Angela Anders           Where to Get Your Medications      These medications were sent to [facility] [address] [facility], [address] [facility]  [address] [facility], [address]  [address]    Phone: [phone]   · docusate sodium 100 MG capsule         Diet:  Diet NPO  ADULT DIET; Regular , Advance as tolerated     Activity:  As tolerated    Consultants: None    Procedures:  Not indicated     Diagnostic Test:   Results for orders placed or performed during the hospital encounter of 07/12/22   COVID-19, Rapid    Specimen: Nasopharyngeal Swab   Result Value Ref Range    Specimen Description . NASOPHARYNGEAL SWAB     SARS-CoV-2, Rapid Not Detected Not Detected Troponin   Result Value Ref Range    Troponin, High Sensitivity 6 0 - 22 ng/L   Troponin   Result Value Ref Range    Troponin, High Sensitivity 9 0 - 22 ng/L   CBC with Auto Differential   Result Value Ref Range    WBC 4.5 3.5 - 11.3 k/uL    RBC 4.91 4.21 - 5.77 m/uL    Hemoglobin 14.2 13.0 - 17.0 g/dL    Hematocrit 41.4 40.7 - 50.3 %    MCV 84.3 82.6 - 102.9 fL    MCH 28.9 25.2 - 33.5 pg    MCHC 34.3 28.4 - 34.8 g/dL    RDW 13.1 11.8 - 14.4 %    Platelets 135 210 - 348 k/uL    MPV 11.3 8.1 - 13.5 fL    NRBC Automated 0.0 0.0 per 100 WBC    Seg Neutrophils 68 (H) 36 - 65 %    Lymphocytes 22 (L) 24 - 43 %    Monocytes 8 3 - 12 %    Eosinophils % 1 1 - 4 %    Basophils 1 0 - 2 %    Immature Granulocytes 0 0 %    Segs Absolute 3.06 1.50 - 8.10 k/uL    Absolute Lymph # 0.99 (L) 1.10 - 3.70 k/uL    Absolute Mono # 0.37 0.10 - 1.20 k/uL    Absolute Eos # 0.04 0.00 - 0.44 k/uL    Basophils Absolute 0.06 0.00 - 0.20 k/uL    Absolute Immature Granulocyte <0.03 0.00 - 0.30 k/uL   D-Dimer, Quantitative   Result Value Ref Range    D-Dimer, Quant 0.22 mg/L FEU   Comprehensive Metabolic Panel   Result Value Ref Range    Glucose 105 (H) 70 - 99 mg/dL    BUN 8 6 - 20 mg/dL    CREATININE 1.12 0.70 - 1.20 mg/dL    Calcium 9.1 8.6 - 10.4 mg/dL    Sodium 142 135 - 144 mmol/L    Potassium 3.4 (L) 3.7 - 5.3 mmol/L    Chloride 107 98 - 107 mmol/L    CO2 28 20 - 31 mmol/L    Anion Gap 7 (L) 9 - 17 mmol/L    Alkaline Phosphatase 53 40 - 129 U/L    ALT 12 5 - 41 U/L    AST 17 <40 U/L    Total Bilirubin 0.76 0.3 - 1.2 mg/dL    Total Protein 6.2 (L) 6.4 - 8.3 g/dL    Albumin 4.1 3.5 - 5.2 g/dL    Albumin/Globulin Ratio 2.0 1.0 - 2.5    GFR Non-African American >60 >60 mL/min    GFR African American >60 >60 mL/min    GFR Comment         Lipase   Result Value Ref Range    Lipase 18 13 - 60 U/L   Magnesium   Result Value Ref Range    Magnesium 1.6 1.6 - 2.6 mg/dL   Troponin   Result Value Ref Range    Troponin, High Sensitivity 8 0 - 22 ng/L CBC   Result Value Ref Range    WBC 5.3 3.5 - 11.3 k/uL    RBC 4.62 4.21 - 5.77 m/uL    Hemoglobin 12.6 (L) 13.0 - 17.0 g/dL    Hematocrit 40.4 (L) 40.7 - 50.3 %    MCV 87.4 82.6 - 102.9 fL    MCH 27.3 25.2 - 33.5 pg    MCHC 31.2 28.4 - 34.8 g/dL    RDW 13.0 11.8 - 14.4 %    Platelets 412 815 - 831 k/uL    MPV 10.0 8.1 - 13.5 fL    NRBC Automated 0.0 0.0 per 100 WBC   Basic Metabolic Panel   Result Value Ref Range    Glucose 115 (H) 70 - 99 mg/dL    BUN 8 6 - 20 mg/dL    CREATININE 1.21 (H) 0.70 - 1.20 mg/dL    Calcium 8.7 8.6 - 10.4 mg/dL    Sodium 142 135 - 144 mmol/L    Potassium 3.4 (L) 3.7 - 5.3 mmol/L    Chloride 108 (H) 98 - 107 mmol/L    CO2 25 20 - 31 mmol/L    Anion Gap 9 9 - 17 mmol/L    GFR Non-African American >60 >60 mL/min    GFR African American >60 >60 mL/min    GFR Comment         EKG 12 Lead   Result Value Ref Range    Ventricular Rate 98 BPM    Atrial Rate 98 BPM    P-R Interval 130 ms    QRS Duration 80 ms    Q-T Interval 342 ms    QTc Calculation (Bazett) 436 ms    P Axis 63 degrees    R Axis 28 degrees    T Axis 36 degrees   EKG 12 Lead   Result Value Ref Range    Ventricular Rate 52 BPM    Atrial Rate 52 BPM    P-R Interval 132 ms    QRS Duration 84 ms    Q-T Interval 452 ms    QTc Calculation (Bazett) 420 ms    P Axis 44 degrees    R Axis 23 degrees    T Axis 46 degrees     CTA CHEST W WO CONTRAST    Result Date: 7/12/2022  EXAMINATION: CTA OF THE CHEST WITH AND WITHOUT CONTRAST; CTA OF THE ABDOMEN AND PELVIS WITH CONTRAST 7/12/2022 7:02 pm TECHNIQUE: CTA of the chest was performed before and after the administration of intravenous contrast.  Multiplanar reformatted images are provided for review. MIP images are provided for review.  Automated exposure control, iterative reconstruction, and/or weight based adjustment of the mA/kV was utilized to reduce the radiation dose to as low as reasonably achievable.; CTA of the abdomen and pelvis was performed with the administration of intravenous contrast. Multiplanar reformatted images are provided for review. MIP images are provided for review. Automated exposure control, iterative reconstruction, and/or weight based adjustment of the mA/kV was utilized to reduce the radiation dose to as low as reasonably achievable. COMPARISON: None. HISTORY: ORDERING SYSTEM PROVIDED HISTORY: eval for dissection, AAA TECHNOLOGIST PROVIDED HISTORY: eval for dissection, AAA; ORDERING SYSTEM PROVIDED HISTORY: r/o dissection TECHNOLOGIST PROVIDED HISTORY: r/o dissection Decision Support Exception - unselect if not a suspected or confirmed emergency medical condition->Emergency Medical Condition (MA) CTA CHEST FINDINGS: Aorta: No evidence of thoracic aortic aneurysm or dissection. No acute abnormality of the aorta. Mediastinum: No evidence of mediastinal lymphadenopathy. The heart and pericardium demonstrate no acute abnormality. No evidence of pulmonary embolism Lungs/Pleura: The lungs are without acute process. No focal consolidation or pulmonary edema. No evidence of pleural effusion or pneumothorax. Soft Tissues/Bones: No acute bone or soft tissue abnormality. CTA ABDOMEN AND PELVIS FINDINGS: Vasculature: . No evidence of abdominal aortic dissection or aneurysm. Celiac axis and major branches are patent. Both renal arteries are patent and appear normal. SMA and visualized GUANAKO are patent. Visualized bilateral iliac arteries are of normal caliber without significant stenosis. Organs: Liver is normal in size and density. No focal masses identified. No evidence of intrahepatic ductal dilatation. Spleen is normal size. The gallbladder is unremarkable. Both adrenal glands are normal.  Pancreas is normal in appearance. Simple cyst in the left kidney. .  The kidneys are otherwise normal in size and attenuation without evidence of hydronephrosis or renal calculi. GI/Bowel: The visualized bowel and mesentery show no mass lesions. Mild colonic diverticulosis.  No evidence of diverticulitis. Normal appendix. Pelvis: No intrapelvic mass is identified. Bladder and rectum are intact. Mild prostatic enlargement Peritoneum/Retroperitoneum: No free fluid. No lymphadenopathy. No evidence of pneumoperitoneum. Bones/Soft Tissues: Small fat containing umbilical hernia. .  No acute bony abnormalities. Postsurgical changes in the scrotal     No acute intra-abdominal or intrapelvic abnormalities are noted. No evidence of thoracic or abdominal aortic aneurysm or dissection. No acute abnormality of the aorta. RECOMMENDATIONS: Based on MIPS measure 405, incidental abd lesions in this patient population do not warrant F/U W/O a documented medical reason. .     XR CHEST PORTABLE    Result Date: 7/12/2022  EXAMINATION: ONE XRAY VIEW OF THE CHEST 7/12/2022 5:32 pm COMPARISON: 02/25/2016 HISTORY: ORDERING SYSTEM PROVIDED HISTORY: chest pain TECHNOLOGIST PROVIDED HISTORY: chest pain Reason for Exam: upr,chest and leg pain FINDINGS: The lungs are without acute focal process. There is no effusion or pneumothorax. The cardiomediastinal silhouette is stable. The osseous structures are stable. No acute process. VL DUP LOWER EXTREMITY VENOUS BILATERAL    Result Date: 7/13/2022    [facility]  Vascular Lower Extremities DVT Study Procedure   Patient Name    [facility]     Date of Study             07/12/2022                  ELODIA A   Date of Birth   1970    Gender                    Male   Age             54 year(s)    Race                      Black   Room Number     9533   Corporate ID #  [id number]   Patient Acct #  [de-identified]   MR #            [id number]       Sonographer               Lesli Santiago, [facility]   Accession #     [id number]    Interpreting Physician    Jean Silva   Referring Nurse               Referring Physician       ER MD *  Practitioner  Procedure Type of Study:   Veins: Lower Extremities DVT Study, Venous Scan Lower Bilateral.  Indications for Study:Pain, leg. Patient Status:ER. Conclusions   Summary   Bilateral:  Technically limited study as stated above however in the visualized areas  no superficial or deep vein thrombosis was identified. Signature   ----------------------------------------------------------------  Electronically signed by Adi Kim RVT(Sonographer) on  07/12/2022 08:34 PM  ----------------------------------------------------------------   ----------------------------------------------------------------  Electronically signed by Jean Silva(Interpreting physician)  on 07/13/2022 07:38 AM  ----------------------------------------------------------------  Findings:   Right Impression:                      Left Impression:  The common femoral, femoral, popliteal The common femoral, femoral,  and tibial veins demonstrate normal    popliteal and tibial veins  compressibility and                    demonstrate normal compressibility  augmentation. Normal compressibility of and augmentation. Normal  the small saphenous vein. compressibility of the small                                         saphenous vein. Risk Factors   - Current - Every day. Velocities are measured in cm/s ; Diameters are measured in cm Right Lower Extremities DVT Study Measurements Right 2D Measurements +------------------------------------+----------+---------------+----------+ ! Location                            ! Visualized! Compressibility! Thrombosis! +------------------------------------+----------+---------------+----------+ ! Common Femoral                      !Yes       ! Yes            ! None      ! +------------------------------------+----------+---------------+----------+ ! Prox Femoral                        !Yes       ! Yes            ! None      ! +------------------------------------+----------+---------------+----------+ ! Mid Femoral                         !Yes       ! Yes            ! None      ! +------------------------------------+----------+---------------+----------+ ! Dist Femoral                        !Yes       ! Yes            ! None      ! +------------------------------------+----------+---------------+----------+ ! Deep Femoral                        !Yes       ! Yes            ! None      ! +------------------------------------+----------+---------------+----------+ ! Popliteal                           !Yes       ! Yes            ! None      ! +------------------------------------+----------+---------------+----------+ ! Sapheno Femoral Junction            ! Yes       ! Yes            ! None      ! +------------------------------------+----------+---------------+----------+ ! PTV                                 ! Yes       ! Yes            ! None      ! +------------------------------------+----------+---------------+----------+ ! Peroneal                            !Yes       ! Yes            ! None      ! +------------------------------------+----------+---------------+----------+ ! Gastroc                             ! Yes       ! Yes            ! None      ! +------------------------------------+----------+---------------+----------+ ! GSV Thigh                           ! Yes       ! Yes            ! None      ! +------------------------------------+----------+---------------+----------+ ! GSV Knee                            ! No        !               !          ! +------------------------------------+----------+---------------+----------+ ! GSV Ankle                           ! No        !               !          ! +------------------------------------+----------+---------------+----------+ ! SSV                                 ! Yes       ! Yes            ! None      ! +------------------------------------+----------+---------------+----------+ Right Doppler Measurements +---------------------------+------+------+--------------------------------+ ! Location                   ! Signal!Reflux! Reflux (msec) ! +---------------------------+------+------+--------------------------------+ ! Common Femoral             !Phasic!      !                                ! +---------------------------+------+------+--------------------------------+ ! Prox Femoral               !Phasic!      !                                ! +---------------------------+------+------+--------------------------------+ ! Popliteal                  !Phasic!      !                                ! +---------------------------+------+------+--------------------------------+ Left Lower Extremities DVT Study Measurements Left 2D Measurements +------------------------------------+----------+---------------+----------+ ! Location                            ! Visualized! Compressibility! Thrombosis! +------------------------------------+----------+---------------+----------+ ! Common Femoral                      !Yes       ! Yes            ! None      ! +------------------------------------+----------+---------------+----------+ ! Prox Femoral                        !Yes       ! Yes            ! None      ! +------------------------------------+----------+---------------+----------+ ! Mid Femoral                         !Yes       ! Yes            ! None      ! +------------------------------------+----------+---------------+----------+ ! Dist Femoral                        !Yes       ! Yes            ! None      ! +------------------------------------+----------+---------------+----------+ ! Deep Femoral                        !Yes       ! Yes            ! None      ! +------------------------------------+----------+---------------+----------+ ! Popliteal                           !Yes       ! Yes            ! None      ! +------------------------------------+----------+---------------+----------+ ! Sapheno Femoral Junction            ! Yes       ! Yes            ! None      ! +------------------------------------+----------+---------------+----------+ ! PTV !Yes       !Yes            ! None      ! +------------------------------------+----------+---------------+----------+ ! Peroneal                            !Yes       ! Yes            ! None      ! +------------------------------------+----------+---------------+----------+ ! Gastroc                             ! Yes       ! Yes            ! None      ! +------------------------------------+----------+---------------+----------+ ! GSV Thigh                           ! Yes       ! Yes            ! None      ! +------------------------------------+----------+---------------+----------+ ! GSV Knee                            ! Yes       ! Yes            ! None      ! +------------------------------------+----------+---------------+----------+ ! GSV Ankle                           ! No        !               !          ! +------------------------------------+----------+---------------+----------+ ! SSV                                 ! Yes       ! Yes            ! None      ! +------------------------------------+----------+---------------+----------+ Left Doppler Measurements +---------------------------+------+------+--------------------------------+ ! Location                   ! Signal!Reflux! Reflux (msec)                   ! +---------------------------+------+------+--------------------------------+ ! Common Femoral             !Phasic!      !                                ! +---------------------------+------+------+--------------------------------+ ! Prox Femoral               !Phasic!      !                                ! +---------------------------+------+------+--------------------------------+ ! Popliteal                  !Phasic!      !                                ! +---------------------------+------+------+--------------------------------+    CTA ABDOMEN PELVIS W CONTRAST    Result Date: 7/12/2022  EXAMINATION: CTA OF THE CHEST WITH AND WITHOUT CONTRAST; CTA OF THE ABDOMEN AND PELVIS WITH CONTRAST 7/12/2022 7:02 pm TECHNIQUE: CTA of the chest was performed before and after the administration of intravenous contrast.  Multiplanar reformatted images are provided for review. MIP images are provided for review. Automated exposure control, iterative reconstruction, and/or weight based adjustment of the mA/kV was utilized to reduce the radiation dose to as low as reasonably achievable.; CTA of the abdomen and pelvis was performed with the administration of intravenous contrast. Multiplanar reformatted images are provided for review. MIP images are provided for review. Automated exposure control, iterative reconstruction, and/or weight based adjustment of the mA/kV was utilized to reduce the radiation dose to as low as reasonably achievable. COMPARISON: None. HISTORY: ORDERING SYSTEM PROVIDED HISTORY: eval for dissection, AAA TECHNOLOGIST PROVIDED HISTORY: eval for dissection, AAA; ORDERING SYSTEM PROVIDED HISTORY: r/o dissection TECHNOLOGIST PROVIDED HISTORY: r/o dissection Decision Support Exception - unselect if not a suspected or confirmed emergency medical condition->Emergency Medical Condition (MA) CTA CHEST FINDINGS: Aorta: No evidence of thoracic aortic aneurysm or dissection. No acute abnormality of the aorta. Mediastinum: No evidence of mediastinal lymphadenopathy. The heart and pericardium demonstrate no acute abnormality. No evidence of pulmonary embolism Lungs/Pleura: The lungs are without acute process. No focal consolidation or pulmonary edema. No evidence of pleural effusion or pneumothorax. Soft Tissues/Bones: No acute bone or soft tissue abnormality. CTA ABDOMEN AND PELVIS FINDINGS: Vasculature: . No evidence of abdominal aortic dissection or aneurysm. Celiac axis and major branches are patent. Both renal arteries are patent and appear normal. SMA and visualized GUANAKO are patent. Visualized bilateral iliac arteries are of normal caliber without significant stenosis. Organs: Liver is normal in size and density. No focal masses identified. No evidence of intrahepatic ductal dilatation. Spleen is normal size. The gallbladder is unremarkable. Both adrenal glands are normal.  Pancreas is normal in appearance. Simple cyst in the left kidney. .  The kidneys are otherwise normal in size and attenuation without evidence of hydronephrosis or renal calculi. GI/Bowel: The visualized bowel and mesentery show no mass lesions. Mild colonic diverticulosis. No evidence of diverticulitis. Normal appendix. Pelvis: No intrapelvic mass is identified. Bladder and rectum are intact. Mild prostatic enlargement Peritoneum/Retroperitoneum: No free fluid. No lymphadenopathy. No evidence of pneumoperitoneum. Bones/Soft Tissues: Small fat containing umbilical hernia. .  No acute bony abnormalities. Postsurgical changes in the scrotal     No acute intra-abdominal or intrapelvic abnormalities are noted. No evidence of thoracic or abdominal aortic aneurysm or dissection. No acute abnormality of the aorta. RECOMMENDATIONS: Based on MIPS measure 405, incidental abd lesions in this patient population do not warrant F/U W/O a documented medical reason. Ivan JUAREZ Cardiac Stress Test Nuclear Imaging    Result Date: 7/13/2022  EXAMINATION: MYOCARDIAL PERFUSION IMAGING 7/13/2022 9:35 am TECHNIQUE: Rest dose:  14.5 mCi Tc-99m sestamibi intravenously Stress dose:  35 mCi Tc-99m sestamibi intravenously Under cardiology supervision, 0.4 mg Lexiscan was infused intravenously prior to injection of the stress dose. SPECT imaging was acquired following injection of the sestamibi. ECG gating was obtained following the stress acquisition. COMPARISON: None Available. HISTORY: ORDERING SYSTEM PROVIDED HISTORY: Chest pain TECHNOLOGIST PROVIDED HISTORY: Reason for Exam: Chest pain Procedure Type->Rx Reason for Exam: chest pain, nausea, hypertension, smoker, family hx heart disease.  77-year-old male with chest pain, nausea, hypertension FINDINGS: The patient achieved a maximum heart rate of 103 beats per minute, 60 % of the maximum age predicted heart rate of 171 beats per minute. Perfusion: There is no scintigraphic evidence for a reversible or fixed perfusion defect to suggest reversible ischemia or infarct. Function: The gated SPECT data demonstrates normal left ventricular size and normal wall motion. Left ventricular ejection fraction:  54% TID score:  1.04 (threshold value of 1.39 is used for Lexiscan stress with Tc-99m). There is no stress-induced cavitary dilatation to suggest compensated triple vessel disease. End diastolic volume:  388SZ Scores are visually adjusted to account for potential artifact. Summed stress score:  0 Summed rest score:  0 Summed reversibility score:  0     1. No definitive scintigraphic evidence for reversible ischemia or infarct. 2. Left ventricular ejection fraction of 54%. 3.  Please see report for EKG portion of the examination which will be performed separately by physician from cardiology. Risk stratification:  Low risk Note:  Risk stratification incorporates both clinical history and test results. Final risk determination is the responsibility of the ordering provider as history and other test results may increase or decrease the risk stratification reported for this examination. Risk stratification criteria are adapted from \"Noninvasive Risk Stratification\" criteria from [employer]. Al, ACC/AATS/AHA/ASE/ASNC/SCAI/SCCT/STS 2017 Appropriate Use Criteria For Coronary Revascularization in Patients With Stable Ischemic Heart Disease [facility] Volume 69, Issue 17, May 2017 High risk (>3% annual death or MI) 1. Severe resting LV dysfunction (LVEF >35%) not readily explained by non coronary causes 2. Resting perfusion abnormalities greater than 10% of the myocardium in patients without prior history or evidence of MI 3.   Stress-induced perfusion abnormalities encumbering greater than or equal to 10% myocardium or stress segmental scores indicating multiple vascular territories with abnormalities 4. Stress-induced LV dilatation (TID ratio greater than 1.19 for exercise and greater than 1.39 for regadenoson) Intermediate risk (1% to 3% annual death or MI) 1. Mild/moderate resting LV dysfunction (LVEF 35% to 49%) not readily explained by non coronary causes. 2.  Resting perfusion abnormalities in 5%-9.9% of the myocardium in patients without a history or prior evidence of MI 3. Stress-induced perfusion abnormality encumbering 5%-9.9% of the myocardium or stress segmental scores indicating 1 vascular territory with abnormalities but without LV dilation 4. Small wall motion abnormality involving 1-2 segments and only 1 coronary bed. Low Risk (Less than 1% annual death or MI) 1. Normal or small myocardial perfusion defect at rest or with stress encumbering less than 5% of the myocardium. RECOMMENDATIONS: Unavailable           Physical Exam:    General appearance - NAD, AOx 3   Lungs -CTAB, no R/R/R  Heart - RRR, no M/R/G  Abdomen - Soft, NT/ND  Neurological:  MAEx4, No focal motor deficit, sensory loss  Extremities - Cap refil <2 sec in all ext., no edema  Skin -warm, dry      Hospital Course:  Clinical course has improved, labs and imaging reviewed. Maria Dolores Gale originally presented to the hospital on 7/12/2022  6:40 PM. with chest pain. At that time it was determined that He required further observation and evaluation with stress testing. Patient had negative stress test, troponins were negative, cardiology work-up was grossly negative. Patient had marked improvement of symptoms with conservative pain control, patient was appropriate for discharge, hemodynamically stable, and outpatient follow-up with primary care provider. He was admitted and labs and imaging were followed daily. Imaging results as above. He is medically stable to be discharged.        Disposition: Home    Patient stated that they will not drive themselves home from the hospital if they have gotten pain killers/ narcotics earlier that day and that they will arrange for transportation on their own or work with the  for a ride. Patient counseled NOT to drive while under the influence of narcotics/ pain killers. Condition: Good    Patient stable and ready for discharge home. I have discussed plan of care with patient and they are in understanding. They were instructed to read discharge paperwork. All of their questions and concerns were addressed. Time Spent: 0 day      --  Campbell Palencia DO  Emergency Medicine Resident Physician    This dictation was generated by voice recognition computer software. Although all attempts are made to edit the dictation for accuracy, there may be errors in the transcription that are not intended.

## 2022-07-13 NOTE — PROGRESS NOTES
CLINICAL PHARMACY NOTE: MEDS TO BEDS    Total # of Prescriptions Filled: 1   The following medications were delivered to the patient:  · Colace 100mg capsules    Additional Documentation: medication delivered to the pt in room 339 on 07.13.22 at 15:18, 1 visitor in the room at the time of delivery. $2 co pay.  Paid with a card onPOS2

## 2022-07-13 NOTE — PROGRESS NOTES
[facility]  CDU / OBSERVATION ENCOUNTER  ATTENDING NOTE       I performed a history and physical examination of the patient and discussed management with the resident or midlevel provider. I reviewed the resident or midlevel provider's note and agree with the documented findings and plan of care. Any areas of disagreement are noted on the chart. I was personally present for the key portions of any procedures. I have documented in the chart those procedures where I was not present during the key portions. I have reviewed the nurses notes. I agree with the chief complaint, past medical history, past surgical history, allergies, medications, social and family history as documented unless otherwise noted below. The Family history, social history, and ROS are effectively unchanged since admission unless noted elsewhere in the chart. Patient admitted with chest discomfort. Patient had chest pain radiating to his back with some nausea and diaphoresis. Patient was evaluated in emergency department without clear pathology being identified. Patient has no history of coronary artery disease and has not had any prior stress testing. Patient with some features are typical and some that are atypical.  Unclear etiology. Heart score with risk factors ends up at around 4. Patient was admitted for further cardiac evaluation. Patient had no EKG changes and normal enzymes. Patient with recent social stressors. Patient will have stress testing performed. Disposition based on testing results.     Christin Doe MD  Attending Emergency  Physician

## 2022-07-13 NOTE — ED NOTES
Report given to [facility], All questions comments and concerns addressed,     Carin Maldonado RN  07/13/22 9434

## 2022-07-13 NOTE — ED PROVIDER NOTES
[address] [facility]  Emergency Department  Emergency Medicine Resident Sign-out     Care of Odilia Wooten was assumed from Dr. Min Xavier and is being seen for Chest Pain and Leg Pain  . The patient's initial evaluation and plan have been discussed with the prior provider who initially evaluated the patient. EMERGENCY DEPARTMENT COURSE / MEDICAL DECISION MAKING:       MEDICATIONS GIVEN:  Orders Placed This Encounter   Medications    fentaNYL (SUBLIMAZE) injection 25 mcg    aspirin chewable tablet 324 mg    ondansetron (ZOFRAN) injection 4 mg    nitroGLYCERIN (NITROSTAT) SL tablet 0.4 mg    lactated ringers bolus    morphine injection 2 mg    iopamidol (ISOVUE-370) 76 % injection 100 mL    0.9 % sodium chloride infusion       LABS / RADIOLOGY:     Labs Reviewed   CBC WITH AUTO DIFFERENTIAL - Abnormal; Notable for the following components:       Result Value    Seg Neutrophils 68 (*)     Lymphocytes 22 (*)     Absolute Lymph # 0.99 (*)     All other components within normal limits   COMPREHENSIVE METABOLIC PANEL - Abnormal; Notable for the following components:    Glucose 105 (*)     Potassium 3.4 (*)     Anion Gap 7 (*)     Total Protein 6.2 (*)     All other components within normal limits   COVID-19, RAPID   TROPONIN   TROPONIN   D-DIMER, QUANTITATIVE   LIPASE   MAGNESIUM   PREVIOUS SPECIMEN       XR CHEST PORTABLE    Result Date: 7/12/2022  EXAMINATION: ONE XRAY VIEW OF THE CHEST 7/12/2022 5:32 pm COMPARISON: 02/25/2016 HISTORY: ORDERING SYSTEM PROVIDED HISTORY: chest pain TECHNOLOGIST PROVIDED HISTORY: chest pain Reason for Exam: upr,chest and leg pain FINDINGS: The lungs are without acute focal process. There is no effusion or pneumothorax. The cardiomediastinal silhouette is stable. The osseous structures are stable. No acute process. RECENT VITALS:     Temp: 98.1 °F (36.7 °C),  Heart Rate: 89, Resp: 17, BP: 126/70, SpO2: 95 %      This patient is a 54 y.o.  Male with chest pain, pain radiates to the back, patient has never had pain like this before. Patient states that the pain also somewhat radiates up to his jaw. Patient does not have any shortness of breath, patient had negative troponins x2, bedside echocardiogram did not show pericardial effusion. CTA was ordered chest pain was additionally radiating to the back, concern for dissection. Pending read for CT a abdomen, if negative admit to observation      ED Course as of 07/13/22 0043   Wed Jul 13, 2022   0020 IMPRESSION:  No acute intra-abdominal or intrapelvic abnormalities are noted.     No evidence of thoracic or abdominal aortic aneurysm or dissection. No acute  abnormality of the aorta. [AF]   6099 Patient placed in observation for evaluation by cardiology tomorrow [AF]      ED Course User Index  [AF] Tessy Valera DO        Heart score for ACS = 4  1. History - 2  Slightly suspicious: 0  Moderately suspicious: 1  Highly suspicious: 2  2. EKG - 0  Normal: 0  Non-specific repolarization disturbance:1  Significant ST depression: 2  3. Age - 1  <45: 0  45-64: 1  >65: 2  4. Risk Factors - 1  None known: 0  1-2: 1  >3: 2  5. Initial Troponin - 0  Normal limit: 0  1-3 x normal limit: 1  >3 x normal limit: 2          OUTSTANDING TASKS / RECOMMENDATIONS:    1. Follow-up CTA  2. Admit observation     FINAL IMPRESSION:     1. Chest pain, unspecified type        DISPOSITION:         DISPOSITION:  []  Discharge   []  Transfer -    [x]  Admission -  ETU   []  Against Medical Advice   []  Eloped   FOLLOW-UP: No follow-up provider specified.    DISCHARGE MEDICATIONS: New Prescriptions    No medications on file          Tessy Valera DO  Emergency Medicine Resident  [facility]       Abilio RomoOrient  Resident  07/13/22 0013

## 2022-07-14 ENCOUNTER — CARE COORDINATION (OUTPATIENT)
Dept: CASE MANAGEMENT | Age: 52
End: 2022-07-14

## 2022-07-14 NOTE — CARE COORDINATION
[facility] Transitions Initial Follow Up Call #1 -Attempted initial 24 hour transitional call to patient/spouse. Left VM on both contact # to return call directly to CTN. Care Transitions Outreach Attempts - day #1    Call within 2 business days of discharge: Yes   Attempted to reach patient for transitions of care follow up. Unable to reach patient or spouse. Patient: Ansley Lunsford   Patient : 1970   MRN: [id number]    Reason for Admission: chest pain of unknown etiology - negative stress test  Discharge Date: 22   RARS: No data recorded  -20 hour OBS    Last Discharge [facility]       Complaint Diagnosis Description Type Department Provider    22 Chest Pain; Leg Pain Chest pain, unspecified type . .. ED to Hosp-Admission (Discharged) (ADMITTED) [facility] 3C Reanna Bain MD; Aleah Bunch. .. Was this an external facility discharge?  No     Noted following upcoming appointments from discharge chart review:   [facility] follow up appointment(s):   Future Appointments   Date Time Provider Summer Lewis   2022  3:30 PM KELTON Landa - CNP [facility] Neuro [address]: STV  Non-face-to-face services provided:  Obtained and reviewed discharge summary and/or continuity of care documents    Bud Woods RN

## 2022-07-15 ENCOUNTER — CARE COORDINATION (OUTPATIENT)
Dept: CASE MANAGEMENT | Age: 52
End: 2022-07-15

## 2022-07-15 DIAGNOSIS — R07.9 CHEST PAIN, UNSPECIFIED TYPE: Primary | ICD-10-CM

## 2022-07-15 NOTE — CARE COORDINATION
appropriate site of care based on symptoms and resources available to patient including: PCP  Specialist  When to call [address] The patient agrees to contact the PCP office for questions related to their healthcare. Medication reconciliation was performed with patient, who verbalizes understanding of administration of home medications. Advised obtaining a 90-day supply of all daily and as-needed medications. Was patient discharged with a pulse oximeter? no    CTN provided contact information. Plan for follow-up call in 5-7 days based on severity of symptoms and risk factors. Plan for next call: symptom management-CP? Routine call  follow up appointment-Did pt receive VM about PCP on 7/28? Interested in sooner appt with VV?         Non-face-to-face services provided:  Scheduled appointment with PCP-7/28/22-soonest available in person  Obtained and reviewed discharge summary and/or continuity of care documents  Reviewed and followed up on pending diagnostic tests and treatments-imagiang and labs    Care Transitions 24 Hour Call    Do you have a copy of your discharge instructions?: No  Do you have all of your prescriptions and are they filled?: No  Have you been contacted by a [employer] [address]?: No  Have you scheduled your follow up appointment?: Yes  How are you going to get to your appointment?: Car - drive self  Do you feel like you have everything you need to keep you well at home?: Yes  Care Transitions Interventions         Follow Up  Future Appointments   Date Time Provider Summer Lewis   7/28/2022  1:30 PM KELTON Massey CNP   8/2/2022  3:30 PM KELTON Moreno CNP, RN

## 2022-07-20 ENCOUNTER — CARE COORDINATION (OUTPATIENT)
Dept: CASE MANAGEMENT | Age: 52
End: 2022-07-20

## 2022-07-26 ENCOUNTER — CARE COORDINATION (OUTPATIENT)
Dept: CASE MANAGEMENT | Age: 52
End: 2022-07-26

## 2022-07-26 NOTE — CARE COORDINATION
[facility] Transitions Follow Up Call    2022    Patient: Steven Das  Patient : 1970   MRN: [id number]  Reason for Admission: CP  Discharge Date: 22 RARS: No data recorded     Message left in compliance with HIPPA. Stated who I was, representing the [facility], Care Transitions Team. Requested to place a call to their Physician with any immediate health needs/questions/concerns.       Future Appointments   Date Time Provider Summer Lewis   2022  9:00 AM Mayank Moore PT [facility]   2022 12:50 PM Yael Schwarz APRN - [facility]   2022  8:30 AM [address], APRN - CNP [facility] Meghana Yates RN

## 2022-07-27 ENCOUNTER — CARE COORDINATION (OUTPATIENT)
Dept: CASE MANAGEMENT | Age: 52
End: 2022-07-27

## 2022-09-07 ENCOUNTER — OFFICE VISIT (OUTPATIENT)
Dept: NEUROSURGERY | Age: 52
End: 2022-09-07
Payer: COMMERCIAL

## 2022-09-07 VITALS
OXYGEN SATURATION: 98 % | HEART RATE: 47 BPM | WEIGHT: 190 LBS | HEIGHT: 67 IN | SYSTOLIC BLOOD PRESSURE: 118 MMHG | DIASTOLIC BLOOD PRESSURE: 68 MMHG | BODY MASS INDEX: 29.82 KG/M2

## 2022-09-07 DIAGNOSIS — M47.812 CERVICAL SPONDYLOSIS: Primary | ICD-10-CM

## 2022-09-07 PROCEDURE — 99212 OFFICE O/P EST SF 10 MIN: CPT | Performed by: NURSE PRACTITIONER

## 2022-09-07 NOTE — PROGRESS NOTES
[address]  [facility] [address], [address] [facility] [address]  Dept: [phone]    Patient:  Ginna Mccoy  YOB: 1970  Date: 9/7/22    The patient is a 54 y.o. male who presents today for consult of the following problems:     Chief Complaint   Patient presents with    Follow-up         HPI:     Ginna Mccoy is a 54 y.o. male who presents for follow up of neck pain. Pain seems to be progressively worsening. Left side worse that right, radiates in to left posterior shoulder. Pain is 9/10, described as a constant pain. Has upcoming appointment with pain management. Plans to discuss repeat RFA, as he has had them in the past with great response. Utilizes ibuprofen, heat/ice with modest temporary relief. Has not yet been able to start PT due to work schedule. Denies any issues with dexterity, hand clumsiness, dropping objects. No gait instability. History:     Past Medical History:   Diagnosis Date    Knee pain, right     trauma at work 11/15    Medial meniscus tear Nov. 18 2015    Snores     possible apnea but not tested yet     Past Surgical History:   Procedure Laterality Date    COLONOSCOPY  04/22/2022    COLONOSCOPY N/A 4/22/2022    COLONOSCOPY POLYPECTOMY HOT BIOPSY WITH INTERNAL HEMORRHOID TREATMENT performed by Che Weiss DO at [address] [facility] ARTHROSCOPY Right 5/3/16    chondroplasty/synovectomy    [address]    VASECTOMY       History reviewed. No pertinent family history. Current Outpatient Medications on File Prior to Visit   Medication Sig Dispense Refill    ibuprofen (ADVIL;MOTRIN) 800 MG tablet Take 800 mg by mouth as needed      medical marijuana Inhale into the lungs daily.       docusate sodium (COLACE) 100 MG capsule Take 1 capsule by mouth daily as needed for Constipation (Patient not taking: No affect  Registration intact  Orientation intact  Judgement intact to situation    Cranial Nerves:   Pupils equal and reactive to light  Extraocular motion intact  Face and shrug symmetric  Tongue midline  No dysarthria  v1-3 sensation symmetric, masseter tone symmetric  Hearing symmetric    Sensation: Intact    Motor  L deltoid 5/5; R deltoid 5/5  L biceps 5/5; R biceps 5/5  L triceps 5/5; R triceps 5/5  L wrist extension 5/5; R wrist extension 5/5  L intrinsics 5/5; R intrinsics 5/5     L iliopsoas 5/5 , R iliopsoas 5/5  L quadriceps 5/5; R quadriceps 5/5  L Dorsiflexion 5/5; R dorsiflexion 5/5  L Plantarflexion 5/5; R plantarflexion 5/5  L EHL 5/5; R EHL 5/5    Reflexes  L Brachioradialis 2+/4; R brachioradialis 2+/4  L Biceps 2+/4; R Biceps 2+/4  L Triceps 2+/4; R Triceps 2+/4  L Patellar 2+/4: R Patellar 2+/4  L Achilles 2+/4; R Achilles 2+/4    hoffmans L: neg  hoffmans R: neg  Clonus L: neg  Clonus R: neg  Babinski L: neg  Babinski R: neg    Studies Review:     No new imaging    Assessment and Plan:      1. Cervical spondylosis          Plan: Patient with persistent, posterior, axial neck pain that is responded well to radiofrequency ablations in the past.  Patient is without radiation to upper extremities, numbness tingling weakness in the hands. No Tony sign, hyperreflexia or long track findings on exam.  Recommend initiation of multimodal physical therapy as previously referred. Maintain upcoming follow-up with pain management as planned. We will see the patient back in 12-16 weeks for reevaluation. Monitor for any new numbness tingling or weakness-contact office for sooner appointment should these occur. Followup: Return in about 4 months (around 1/7/2023), or if symptoms worsen or fail to improve. Prescriptions Ordered:  No orders of the defined types were placed in this encounter. Orders Placed:  No orders of the defined types were placed in this encounter.        Electronically signed by KELTON Landa CNP on 9/7/2022 at 9:04 AM    Please note that this chart was generated using voice recognition Dragon dictation software. Although every effort was made to ensure the accuracy of this automated transcription, some errors in transcription may have occurred.

## 2022-09-09 ENCOUNTER — TELEPHONE (OUTPATIENT)
Dept: FAMILY MEDICINE CLINIC | Age: 52
End: 2022-09-09

## 2022-09-09 NOTE — TELEPHONE ENCOUNTER
Triage call from [facility] ([facility]) to office     Brief description of triage: c/o neck/shoulder pain/tingling in the arm/increased urination    Onset? 1/2 days ago    Location? Neck/shoulder    Pattern? N/A    Severity on a scale of 1-10? Radiation? N/A    Pregnancy? N/A    Outcome? Patient called in stating he has been having neck/back(more so shoulder) pain and tingling in the arm that started 1/2 days go. Also having increased urination. Patient states he had rotator cuff surgery recently and that is why he thinks he has the shoulder pain. Patient does not think it is anything radiating from his chest and refuses ER visit. Patient wants appointment with his PCP to discuss these issues. Patient advised to seek treatment with ER if symptoms worsen before appointment on 9/16.

## 2022-09-15 ENCOUNTER — OFFICE VISIT (OUTPATIENT)
Dept: FAMILY MEDICINE CLINIC | Age: 52
End: 2022-09-15
Payer: COMMERCIAL

## 2022-09-15 VITALS
WEIGHT: 184 LBS | SYSTOLIC BLOOD PRESSURE: 120 MMHG | HEIGHT: 67 IN | HEART RATE: 58 BPM | BODY MASS INDEX: 28.88 KG/M2 | DIASTOLIC BLOOD PRESSURE: 82 MMHG | TEMPERATURE: 97.7 F | OXYGEN SATURATION: 98 % | RESPIRATION RATE: 16 BRPM

## 2022-09-15 DIAGNOSIS — Z13.29 THYROID DISORDER SCREENING: ICD-10-CM

## 2022-09-15 DIAGNOSIS — M54.12 CERVICAL RADICULOPATHY: Primary | ICD-10-CM

## 2022-09-15 DIAGNOSIS — Z13.1 DIABETES MELLITUS SCREENING: ICD-10-CM

## 2022-09-15 DIAGNOSIS — M25.552 LEFT HIP PAIN: ICD-10-CM

## 2022-09-15 DIAGNOSIS — Z12.5 PROSTATE CANCER SCREENING: ICD-10-CM

## 2022-09-15 DIAGNOSIS — R06.83 SNORING: ICD-10-CM

## 2022-09-15 DIAGNOSIS — Z13.220 LIPID SCREENING: ICD-10-CM

## 2022-09-15 PROCEDURE — 99214 OFFICE O/P EST MOD 30 MIN: CPT | Performed by: NURSE PRACTITIONER

## 2022-09-15 RX ORDER — MELOXICAM 15 MG/1
15 TABLET ORAL DAILY
Qty: 30 TABLET | Refills: 1 | Status: SHIPPED | OUTPATIENT
Start: 2022-09-15

## 2022-09-15 SDOH — ECONOMIC STABILITY: FOOD INSECURITY: WITHIN THE PAST 12 MONTHS, YOU WORRIED THAT YOUR FOOD WOULD RUN OUT BEFORE YOU GOT MONEY TO BUY MORE.: NEVER TRUE

## 2022-09-15 SDOH — ECONOMIC STABILITY: FOOD INSECURITY: WITHIN THE PAST 12 MONTHS, THE FOOD YOU BOUGHT JUST DIDN'T LAST AND YOU DIDN'T HAVE MONEY TO GET MORE.: NEVER TRUE

## 2022-09-15 ASSESSMENT — ENCOUNTER SYMPTOMS
NAUSEA: 0
VOMITING: 0
DIARRHEA: 0
COUGH: 0
EYE PAIN: 0
SINUS PAIN: 0
ABDOMINAL PAIN: 0
SORE THROAT: 0
BACK PAIN: 0
SHORTNESS OF BREATH: 0

## 2022-09-15 ASSESSMENT — SOCIAL DETERMINANTS OF HEALTH (SDOH): HOW HARD IS IT FOR YOU TO PAY FOR THE VERY BASICS LIKE FOOD, HOUSING, MEDICAL CARE, AND HEATING?: NOT HARD AT ALL

## 2022-09-15 NOTE — PROGRESS NOTES
[address]  [address] [facility] [address] [address]  Dept: [phone]  Dept Fax: [phone]    Ansley Lunsford is a 54 y.o. male who presents today for his medicalconditions/complaints as noted below. Ansley Lunsford is c/o of Neck Pain and Back Pain      HPI:     81-year-old male patient presents with complaints of encounter to follow up     Significant history of chronic neck pain, cervical radiculopathy. Reports that he has had chronic neck pain for several years. Patient seeing [facility] neurosurgery and pain management, had positive MRI in 2020 has since had a subsequently injury. Patient follows with pain management doctor Lakeisha and treats with Percocet, Zanaflex not taking, Motrin prn, additionally uses medical marijuana from Dr. Nessa Pratt. Patient does have concerns for constipation and blood in his stool. Patient reportedly does not have regular bowel movements. Patient does not treat with any stool softeners or laxatives despite chronic opiate use. Patient denies any rectal pain. Does report blood when wiping and bright red blood in his toilet. Has never had a colonoscopy. Denies abdominal pain, nausea, vomiting or diarrhea. Denies previous abdominal hx or surgeries. Rectal bleeding has resolved, did complete colonoscopy, benign polyps, recommended repeat in 1 year due to poor prep. Concerns for sleep apnea, patient does snore, witnessed episodes of apnea, chronic fatigue, didn't do sleep study.                   Past Medical History:   Diagnosis Date    Knee pain, right     trauma at work 11/15    Medial meniscus tear Nov. 18 2015    Snores     possible apnea but not tested yet        Current Outpatient Medications   Medication Sig Dispense Refill    meloxicam (MOBIC) 15 MG tablet Take 1 tablet by mouth daily 30 tablet 1    ibuprofen (ADVIL;MOTRIN) 800 MG tablet Take 800 mg by mouth as needed      medical marijuana Inhale into the lungs daily. No current facility-administered medications for this visit. Allergies   Allergen Reactions    Pear Itching    Hydrocodone-Acetaminophen Other (See Comments) and Itching       Subjective:      Review of Systems   Constitutional:  Negative for chills and fatigue. HENT:  Negative for congestion, ear pain, sinus pain and sore throat. Eyes:  Negative for pain and visual disturbance. Respiratory:  Negative for cough and shortness of breath. Cardiovascular:  Negative for chest pain and palpitations. Gastrointestinal:  Negative for abdominal pain, diarrhea, nausea and vomiting. Genitourinary:  Negative for penile pain and testicular pain. Musculoskeletal:  Negative for back pain, joint swelling and neck pain. Skin:  Negative for rash. Neurological:  Negative for dizziness and light-headedness. Hematological:  Does not bruise/bleed easily. All other systems reviewed and are negative.    :Objective     Physical Exam  Vitals and nursing note reviewed. Constitutional:       General: He is not in acute distress. Appearance: Normal appearance. He is not toxic-appearing. Cardiovascular:      Rate and Rhythm: Normal rate. Pulmonary:      Effort: Pulmonary effort is normal.      Breath sounds: Normal breath sounds. Musculoskeletal:      Cervical back: Tenderness present. Left upper leg: Tenderness present. Skin:     General: Skin is warm and dry. Neurological:      General: No focal deficit present. Mental Status: He is alert and oriented to person, place, and time.      /82 (Site: Left Upper Arm, Position: Sitting, Cuff Size: Medium Adult)   Pulse 58   Temp 97.7 °F (36.5 °C) (Tympanic)   Resp 16   Ht 5' 7\" (1.702 m)   Wt 184 lb (83.5 kg)   SpO2 98%   BMI 28.82 kg/m²     Lab Review   Admission on 07/12/2022, Discharged on 07/13/2022   Component Date Value    Ventricular Rate 07/12/2022 98     Atrial Rate 07/12/2022 98     P-R Interval 07/12/2022 130     QRS Duration 07/12/2022 80     Q-T Interval 07/12/2022 342     QTc Calculation (Bazett) 07/12/2022 436     P Axis 07/12/2022 63     R [place] 07/12/2022 28     T Axis 07/12/2022 36     Troponin, High Sensitivi* 07/12/2022 6     Troponin, High Sensitivi* 07/12/2022 9     WBC 07/12/2022 4.5     RBC 07/12/2022 4.91     Hemoglobin 07/12/2022 14.2     Hematocrit 07/12/2022 41.4     MCV 07/12/2022 84.3     MCH 07/12/2022 28.9     MCHC 07/12/2022 34.3     RDW 07/12/2022 13.1     Platelets 38/19/7691 280     MPV 07/12/2022 11.3     NRBC Automated 07/12/2022 0.0     Seg Neutrophils 07/12/2022 68 (A)    Lymphocytes 07/12/2022 22 (A)    Monocytes 07/12/2022 8     Eosinophils % 07/12/2022 1     Basophils 07/12/2022 1     Immature Granulocytes 07/12/2022 0     Segs Absolute 07/12/2022 3.06     Absolute Lymph # 07/12/2022 0.99 (A)    Absolute Mono # 07/12/2022 0.37     Absolute Eos # 07/12/2022 0.04     Basophils Absolute 07/12/2022 0.06     Absolute Immature Granul* 07/12/2022 <0.03     D-Dimer, Quant 07/12/2022 0.22     Glucose 07/12/2022 105 (A)    BUN 07/12/2022 8     Creatinine 07/12/2022 1.12     Calcium 07/12/2022 9.1     Sodium 07/12/2022 142     Potassium 07/12/2022 3.4 (A)    Chloride 07/12/2022 107     CO2 07/12/2022 28     Anion Gap 07/12/2022 7 (A)    Alkaline Phosphatase 07/12/2022 53     ALT 07/12/2022 12     AST 07/12/2022 17     Total Bilirubin 07/12/2022 0.76     Total Protein 07/12/2022 6.2 (A)    Albumin 07/12/2022 4.1     Albumin/Globulin Ratio 07/12/2022 2.0     GFR Non- 07/12/2022 >60     GFR  07/12/2022 >60     GFR Comment 07/12/2022          Lipase 07/12/2022 18     Magnesium 07/12/2022 1.6     Specimen Description 07/13/2022 . NASOPHARYNGEAL SWAB     SARS-CoV-2, Rapid 07/13/2022 Not Detected     Ventricular Rate 07/13/2022 52     Atrial Rate 07/13/2022 52     P-R Interval 07/13/2022 132     QRS Duration 07/13/2022 84     Q-T Interval 07/13/2022 452 QTc Calculation (Bazett) 07/13/2022 420     P Axis 07/13/2022 44     R Axis 07/13/2022 23     T Axis 07/13/2022 46     Troponin, High Sensitivi* 07/13/2022 8     WBC 07/13/2022 5.3     RBC 07/13/2022 4.62     Hemoglobin 07/13/2022 12.6 (A)    Hematocrit 07/13/2022 40.4 (A)    MCV 07/13/2022 87.4     MCH 07/13/2022 27.3     MCHC 07/13/2022 31.2     RDW 07/13/2022 13.0     Platelets 96/04/9941 242     MPV 07/13/2022 10.0     NRBC Automated 07/13/2022 0.0     Glucose 07/13/2022 115 (A)    BUN 07/13/2022 8     Creatinine 07/13/2022 1.21 (A)    Calcium 07/13/2022 8.7     Sodium 07/13/2022 142     Potassium 07/13/2022 3.4 (A)    Chloride 07/13/2022 108 (A)    CO2 07/13/2022 25     Anion Gap 07/13/2022 9     GFR Non- 07/13/2022 >60     GFR  07/13/2022 >60     GFR Comment 07/13/2022          Left Ventricular Ejectio* 07/13/2022 54     LVEF MODALITY 07/13/2022 Nuclear    Admission on 04/22/2022, Discharged on 04/22/2022   Component Date Value    Surgical Pathology Report 04/22/2022                      Value:-- Diagnosis --  Rectum, colonoscopic polypectomies:  Hyperplastic polyps. No dysplasia seen. DAKOTA Le.  **Electronically Signed Out**         sf/4/26/2022       Clinical Information  Pre-op Diagnosis:  HISTORY OF BRIGHT RED BLOOD PER RECTUM   Operative Findings:  RECTAL POLYP x 3  Operation Performed:  COLONOSCOPY, POLYPECTOMY HOT BIOPSY WITH  INTERNAL HEMORRHOID TREATMENT    Source of Specimen  A: RECTAL POLYP X3    Gross Description  \"ELODIA CARLOS, RECTAL POLYP x 3\" Three tan-white tissue  fragments from 0.4 to 0.7 cm and are 1.6 x 0.5 x 0.3 cm in aggregate. Entirely 1cs. yr tm      Microscopic Description  2 H&E reviewed. Microscopic examination performed. SURGICAL PATHOLOGY CONSULTATION       Patient Name: Judith Meier   Med Rec: [id number]  Path Number: [id number]    [facility] PATHOLOGISTS [facility]  ANATOMIC [address] [place], [address]  [phone]  Fax: [phone]         Clinical:  Neck pain and radiculopathy     EXAM: MRI CERVICAL SPINE. Procedure:  Multiplanar spin echo MRI cervical spine performed. Comparison: none     Findings:   Cervical vertebral body heights and alignment and signal are normal. Craniocervical junction is normal.   Signal in the cervical spinal cord is normal.   There is reversal of the normal cervical curvature. There are degenerative disc changes at all cervical levels with disc space narrowing and spurs. At C2-3, there is tiny central disc protrusion without spinal stenosis. At C3-4, there is moderate size central disc protrusion/herniation that touches and slightly flattens the anterior mid spinal cord. At C4-5, there is disc bulge and posterior spurs with mild cord flattening. At C5-6, there is degenerative disc disease. There is disc bulge greater towards the right as well as posterior spurring resulting in mild flattening of the right anterior spinal cord. At C6-7, there is left paracentral and foraminal disc protrusion with mild flattening of the left anterior spinal cord. There is posterior spurring. At C7-T1, no disc herniation or spinal stenosis. Impression:   1. C3-4 central disc herniation with flattening of the mid anterior cervical spinal cord. 2. C5-6 disc bulge greater towards the right with mild right cord flattening. 3. C6-7 left paracentral foraminal disc protrusion with mild left cord flattening. Assessment and Plan      1. Cervical radiculopathy  -     meloxicam (MOBIC) 15 MG tablet; Take 1 tablet by mouth daily, Disp-30 tablet, R-1Normal  2. Lipid screening  -     Lipid Panel; Future  3. Diabetes mellitus screening  -     Hemoglobin A1C; Future  4. Prostate cancer screening  -     PSA Screening; Future  5. Left hip pain  -     meloxicam (MOBIC) 15 MG tablet;  Take 1 tablet by mouth daily, Disp-30 tablet, R-1Normal  -     XR HIP 2-3 VW W PELVIS LEFT; Future  6. Thyroid disorder screening  -     TSH With Reflex Ft4; Future  7. Snoring  -     Baseline Diagnostic Sleep Study; Future       Labs reviewed  Additional labs ordered  Sleep study faxed  Trial meloxicam  F/u with pain and neurosurgery        No results found for this visit on 09/15/22. Return if symptoms worsen or fail to improve. Orders Placed This Encounter   Medications    meloxicam (MOBIC) 15 MG tablet     Sig: Take 1 tablet by mouth daily     Dispense:  30 tablet     Refill:  1        Patient given educational materials - see patient instructions. Discussed use, benefit, and side effects of prescribed medications. All patientquestions answered. Pt voiced understanding. Patient given educational materials - see patient instructions. Discussed use, benefit, and side effects of prescribed medications. All patientquestions answered. Pt voiced understanding. This note was transcribed using dictation with Dragon services. Efforts were made to correct any errors but some words may be misinterpreted.     Patient assumes risks associated with failure to complete recommended testing and treatments in a timely manner    Electronically signed by KELTON Gaines CNP on 9/15/2022at 12:28 PM

## 2022-09-15 NOTE — PROGRESS NOTES
Visit Information    Have you changed or started any medications since your last visit including any over-the-counter medicines, vitamins, or herbal medicines? no   Are you having any side effects from any of your medications? -  no  Have you stopped taking any of your medications? Is so, why? -  no    Have you seen any other physician or provider since your last visit? No  Have you had any other diagnostic tests since your last visit? No  Have you been seen in the emergency room and/or had an admission to a hospital since we last saw you? No  Have you had your routine dental cleaning in the past 6 months? yes -     Have you activated your [employer] account? If not, what are your barriers?  Yes     Patient Care Team:  KELTON Massey CNP as PCP - General (Certified Nurse Practitioner)  KELTON Massey CNP as PCP - [facility] Provider    Medical History Review  Past Medical, Family, and Social History reviewed and does contribute to the patient presenting condition    Health Maintenance   Topic Date Due    HIV screen  Never done    Hepatitis C screen  Never done    Lipids  Never done    Diabetes screen  02/09/2019    COVID-19 Vaccine (2 - Booster for Apolinar series) 07/05/2021    Flu vaccine (1) Never done    Depression Screen  03/17/2023    DTaP/Tdap/Td vaccine (2 - Td or Tdap) 05/09/2026    Colorectal Cancer Screen  04/22/2032    Hepatitis A vaccine  Aged Out    Hepatitis B vaccine  Aged Out    Hib vaccine  Aged Out    Meningococcal (ACWY) vaccine  Aged Out    Pneumococcal 0-64 years Vaccine  Aged Out

## 2022-09-15 NOTE — PATIENT INSTRUCTIONS
Patient Education        Back Pain: Care Instructions  Overview     In most cases, there isn't a clear cause for back pain. It may be related to problems with muscles and ligaments of the back. It may also be related to problems with the nerves, discs, or bones of the back. Moving, lifting, standing, sitting, or sleeping in an awkward way can strain the back. Arthritis is another cause of back pain. Although it may hurt a lot, back pain usually improves on its own within several weeks. Most people recover in 12 weeks or less. Using self-care, such as ice or heat and light activity (like walking) may help you feel better sooner. Follow-up care is a key part of your treatment and safety. Be sure to make and go to all appointments, and call your doctor if you are having problems. It's also a good idea to know your test results and keep a list of the medicines you take. How can you care for yourself at home? Sit or lie in positions that are most comfortable and reduce your pain. Try one of these positions when you lie down:  Lie on your back with your knees bent and supported by pillows. Lie on the floor with your legs on the seat of a sofa or chair. Lie on your side with your knees and hips bent and a pillow between your legs. Lie on your stomach if it does not make pain worse. Do not sit up in bed, and avoid soft couches and twisted positions. Bed rest can help relieve pain at first, but it delays healing. Avoid bed rest after the first day of back pain. Change positions every 30 minutes. If you must sit for long periods of time, take breaks from sitting. Get up and walk around, or lie in a comfortable position. Try using a heating pad on a low or medium setting for 15 to 20 minutes every 2 or 3 hours. Try a warm shower in place of one session with the heating pad. You can also try an ice pack for 10 to 15 minutes every 2 to 3 hours. Put a thin cloth between the ice pack and your skin.   Take pain medicines exactly as directed. If the doctor gave you a prescription medicine for pain, take it as prescribed. If you are not taking a prescription pain medicine, ask your doctor if you can take an over-the-counter medicine. Take short walks several times a day. You can start with 5 to 10 minutes, 3 or 4 times a day, and work up to longer walks. Walk on level surfaces and avoid hills and stairs until your back is better. Return to work and other activities as soon as you can. Continued rest without activity is usually not good for your back. To prevent future back pain, do exercises to stretch and strengthen your back and stomach. Learn how to use good posture, safe lifting techniques, and proper body mechanics. When should you call for help? Call your doctor now or seek immediate medical care if:    You have new or worsening numbness in your legs. You have new or worsening weakness in your legs. (This could make it hard to stand up.)     You lose control of your bladder or bowels. Watch closely for changes in your health, and be sure to contact your doctor if:    You have a fever, lose weight, or don't feel well. You do not get better as expected. Where can you learn more? Go to https://LyxiapeMYTRND.[employer]. org and sign in to your [employer] account. Enter O789 in the [employer] box to learn more about \"Back Pain: Care Instructions. \"     If you do not have an account, please click on the \"Sign Up Now\" link. Current as of: March 9, 2022               Content Version: 13.4  © 2006-2022 Healthwise, Incorporated. Care instructions adapted under license by [facility] ([facility]). If you have questions about a medical condition or this instruction, always ask your healthcare professional. [place] [address] any warranty or liability for your use of this information.

## 2023-02-08 ENCOUNTER — TELEPHONE (OUTPATIENT)
Dept: SURGERY | Age: 53
End: 2023-02-08

## 2023-02-08 NOTE — TELEPHONE ENCOUNTER
[address] [facility] Surgery  Screening colonoscopy questionnaire  Rinku Hillman    Pt Name: Swapna Samuels  MRN: [id number]  YOB: 1970  Primary Care Physician: KELTON Motley - CNP      Have you ever had a colonoscopy? PATIENTS 2022 COLONOSCOPY CANCELLED D/T POOR BOWEL PREP   When was your last colonoscopy? N/A  Were any polyps removed or any other significant findings? N/A    Have you recently had a stool test to check for colon cancer? No  Was it positive? No    Do you have any family history of colon cancer? No  If yes, which family member had colon cancer? N/A  Were they diagnosed younger or older than the age of 61? N/A    Do you have a history of Crohn's disease or Ulcerative Colitis? No    Do you have a history of constipation? No    Do you have a history of bloody or black stools? No    Have you ever had surgery done inside your abdomen? No  What surgery? N/A      Schedulin/8/23 - Spoke to patient, colonoscopy scheduled at [facility], patient confirmed and information mailed to patient. Surgery date/time: 23 at 9:30AM  Arrival time: 8AM  Prep appt[facility]STRU[facility] MAILED TO PATIENT.

## 2023-03-27 ENCOUNTER — TELEPHONE (OUTPATIENT)
Dept: SURGERY | Age: 53
End: 2023-03-27

## 2023-03-27 RX ORDER — POLYETHYLENE GLYCOL 3350, SODIUM SULFATE ANHYDROUS, SODIUM BICARBONATE, SODIUM CHLORIDE, POTASSIUM CHLORIDE 236; 22.74; 6.74; 5.86; 2.97 G/4L; G/4L; G/4L; G/4L; G/4L
POWDER, FOR SOLUTION ORAL
Qty: 4000 ML | Refills: 0 | Status: SHIPPED | OUTPATIENT
Start: 2023-03-27

## 2023-04-26 ENCOUNTER — TELEPHONE (OUTPATIENT)
Dept: FAMILY MEDICINE CLINIC | Age: 53
End: 2023-04-26

## 2023-04-26 DIAGNOSIS — Z12.11 COLON CANCER SCREENING: Primary | ICD-10-CM

## 2023-04-26 NOTE — TELEPHONE ENCOUNTER
----- Message from Otoniel Miller sent at 4/26/2023  1:41 PM EDT -----  Subject: Referral Request    Reason for referral request? colon screening   Provider patient wants to be referred to(if known):     Provider Phone Number(if known): Additional Information for Provider? pt stated that the insurance want pay   for the first order.  Now he has a new insurance and wants to reschedule   ---------------------------------------------------------------------------  --------------  [id number] [employer]    [phone]; OK to leave message on voicemail  ---------------------------------------------------------------------------  --------------

## 2023-04-26 NOTE — TELEPHONE ENCOUNTER
Patient would like an order for his colonoscopy to be placed. Once placed, patient would like to be contacted with the schedule info. He was unable to do previous colonoscopy due to insurance issues which have now been resolved. Please advise.

## 2023-04-27 NOTE — TELEPHONE ENCOUNTER
Pt requested a voicemail, but voicemail box was full. Sent a [email or web address] message with the following information:    Tati Macias! The number to call for scheduling your colonoscopy is [phone]. Your appointment with Adam Hernandez is going to be Tuesday, May 2, 2023 at 1:50pm.     Feel free to call if you have any questions. I tried to leave a voicemail but your voicemail box is full.      Have a great day!!!!   Delfin Soriano

## 2023-04-28 ENCOUNTER — TELEPHONE (OUTPATIENT)
Dept: SURGERY | Age: 53
End: 2023-04-28

## 2023-06-12 PROBLEM — R35.1 NOCTURIA: Status: ACTIVE | Noted: 2023-06-12

## 2023-06-12 PROBLEM — R39.15 URINARY URGENCY: Status: ACTIVE | Noted: 2023-06-12

## 2023-06-12 PROBLEM — N13.8 BPH WITH OBSTRUCTION/LOWER URINARY TRACT SYMPTOMS: Status: ACTIVE | Noted: 2023-06-12

## 2023-06-12 PROBLEM — N32.89 BLADDER WALL THICKENING: Status: ACTIVE | Noted: 2023-06-12

## 2023-06-12 PROBLEM — N40.1 BPH WITH OBSTRUCTION/LOWER URINARY TRACT SYMPTOMS: Status: ACTIVE | Noted: 2023-06-12

## 2023-06-28 ENCOUNTER — PROCEDURE VISIT (OUTPATIENT)
Age: 53
End: 2023-06-28
Payer: MEDICAID

## 2023-06-28 VITALS — HEIGHT: 67 IN | BODY MASS INDEX: 28.88 KG/M2 | WEIGHT: 184 LBS

## 2023-06-28 DIAGNOSIS — R35.1 NOCTURIA: ICD-10-CM

## 2023-06-28 DIAGNOSIS — N40.1 BPH WITH OBSTRUCTION/LOWER URINARY TRACT SYMPTOMS: Primary | ICD-10-CM

## 2023-06-28 DIAGNOSIS — N13.8 BPH WITH OBSTRUCTION/LOWER URINARY TRACT SYMPTOMS: Primary | ICD-10-CM

## 2023-06-28 DIAGNOSIS — N32.89 BLADDER WALL THICKENING: ICD-10-CM

## 2023-06-28 PROCEDURE — 52000 CYSTOURETHROSCOPY: CPT | Performed by: SPECIALIST

## 2023-06-28 PROCEDURE — 51741 ELECTRO-UROFLOWMETRY FIRST: CPT | Performed by: SPECIALIST

## 2023-06-28 PROCEDURE — 99213 OFFICE O/P EST LOW 20 MIN: CPT | Performed by: SPECIALIST

## 2023-06-28 RX ORDER — CEPHALEXIN 500 MG/1
500 CAPSULE ORAL 2 TIMES DAILY
Qty: 2 CAPSULE | Refills: 0 | Status: SHIPPED | OUTPATIENT
Start: 2023-06-28

## 2023-07-20 PROBLEM — Z12.11 COLON CANCER SCREENING: Status: ACTIVE | Noted: 2023-07-20

## 2023-07-20 PROBLEM — Z12.11 COLON CANCER SCREENING: Status: RESOLVED | Noted: 2023-07-20 | Resolved: 2023-07-20

## 2024-05-21 ENCOUNTER — TELEPHONE (OUTPATIENT)
Dept: FAMILY MEDICINE CLINIC | Age: 54
End: 2024-05-21

## 2024-05-21 NOTE — TELEPHONE ENCOUNTER
----- Message from Dorothy Candelario sent at 5/21/2024  2:48 PM EDT -----  Regarding: ECC Escalation To Practice  ECC Escalation To Practice      Type of Escalation: Acute Care Symptom  --------------------------------------------------------------------------------------------------------------------------    Information for Provider:  Patient is looking for appointment for: Symptom Rashes on the neck area and Belly button area  Reasons for Message: Other     --------------------------------------------------------------------------------------------------------------------------    Relationship to Patient: Spouse/Partner     Call Back Info: OK to leave message on voicemail  Preferred Call Back Number: Phone [phone]

## 2024-05-23 ENCOUNTER — OFFICE VISIT (OUTPATIENT)
Dept: FAMILY MEDICINE CLINIC | Age: 54
End: 2024-05-23
Payer: MEDICAID

## 2024-05-23 VITALS
BODY MASS INDEX: 30.01 KG/M2 | OXYGEN SATURATION: 98 % | HEIGHT: 67 IN | TEMPERATURE: 97.8 F | HEART RATE: 57 BPM | SYSTOLIC BLOOD PRESSURE: 118 MMHG | DIASTOLIC BLOOD PRESSURE: 72 MMHG | WEIGHT: 191.2 LBS

## 2024-05-23 DIAGNOSIS — Z13.1 DIABETES MELLITUS SCREENING: ICD-10-CM

## 2024-05-23 DIAGNOSIS — Z12.11 COLON CANCER SCREENING: ICD-10-CM

## 2024-05-23 DIAGNOSIS — Z13.29 THYROID DISORDER SCREENING: ICD-10-CM

## 2024-05-23 DIAGNOSIS — Z12.5 PROSTATE CANCER SCREENING: ICD-10-CM

## 2024-05-23 DIAGNOSIS — Z11.4 ENCOUNTER FOR SCREENING FOR HIV: ICD-10-CM

## 2024-05-23 DIAGNOSIS — M54.12 CERVICAL RADICULOPATHY: Primary | ICD-10-CM

## 2024-05-23 DIAGNOSIS — L30.9 DERMATITIS: ICD-10-CM

## 2024-05-23 DIAGNOSIS — Z11.59 NEED FOR HEPATITIS C SCREENING TEST: ICD-10-CM

## 2024-05-23 DIAGNOSIS — Z13.220 LIPID SCREENING: ICD-10-CM

## 2024-05-23 PROCEDURE — 99214 OFFICE O/P EST MOD 30 MIN: CPT | Performed by: NURSE PRACTITIONER

## 2024-05-23 RX ORDER — MELOXICAM 15 MG/1
15 TABLET ORAL DAILY
Qty: 30 TABLET | Refills: 0 | Status: SHIPPED | OUTPATIENT
Start: 2024-05-23

## 2024-05-23 RX ORDER — BACLOFEN 10 MG/1
10 TABLET ORAL 2 TIMES DAILY
Qty: 60 TABLET | Refills: 1 | Status: SHIPPED | OUTPATIENT
Start: 2024-05-23

## 2024-05-23 RX ORDER — TRIAMCINOLONE ACETONIDE 5 MG/G
CREAM TOPICAL
Qty: 30 G | Refills: 0 | Status: SHIPPED | OUTPATIENT
Start: 2024-05-23

## 2024-05-23 SDOH — ECONOMIC STABILITY: HOUSING INSECURITY
IN THE LAST 12 MONTHS, WAS THERE A TIME WHEN YOU DID NOT HAVE A STEADY PLACE TO SLEEP OR SLEPT IN A SHELTER (INCLUDING NOW)?: NO

## 2024-05-23 SDOH — ECONOMIC STABILITY: INCOME INSECURITY: HOW HARD IS IT FOR YOU TO PAY FOR THE VERY BASICS LIKE FOOD, HOUSING, MEDICAL CARE, AND HEATING?: NOT HARD AT ALL

## 2024-05-23 SDOH — ECONOMIC STABILITY: FOOD INSECURITY: WITHIN THE PAST 12 MONTHS, YOU WORRIED THAT YOUR FOOD WOULD RUN OUT BEFORE YOU GOT MONEY TO BUY MORE.: NEVER TRUE

## 2024-05-23 SDOH — ECONOMIC STABILITY: FOOD INSECURITY: WITHIN THE PAST 12 MONTHS, THE FOOD YOU BOUGHT JUST DIDN'T LAST AND YOU DIDN'T HAVE MONEY TO GET MORE.: NEVER TRUE

## 2024-05-23 ASSESSMENT — ENCOUNTER SYMPTOMS
COUGH: 0
SORE THROAT: 0
SHORTNESS OF BREATH: 0
VOMITING: 0
ABDOMINAL PAIN: 0
NAUSEA: 0
DIARRHEA: 0
SINUS PAIN: 0
BACK PAIN: 0
EYE PAIN: 0

## 2024-05-23 ASSESSMENT — PATIENT HEALTH QUESTIONNAIRE - PHQ9
SUM OF ALL RESPONSES TO PHQ QUESTIONS 1-9: 0
1. LITTLE INTEREST OR PLEASURE IN DOING THINGS: NOT AT ALL
2. FEELING DOWN, DEPRESSED OR HOPELESS: NOT AT ALL
SUM OF ALL RESPONSES TO PHQ QUESTIONS 1-9: 0
SUM OF ALL RESPONSES TO PHQ9 QUESTIONS 1 & 2: 0

## 2024-05-23 NOTE — PROGRESS NOTES
[facility]  [facility]  [address] [address]  Dept: [phone]  Dept Fax: [phone]    Emre Ly is a 53 y.o. male who presents today for his medicalconditions/complaints as noted below.  Emre Ly is c/o of Rash (On neck and stomach ) and Neck Pain (Injections aren't working)      HPI:     53-year-old male patient presents with complaints of encounter to follow up     Significant history of chronic neck pain, cervical radiculopathy.  Reports that he has had chronic neck pain for several years.  Follows with pain management [facility] for pain, recent cervical injections. Reports no significant improvement in pain at present. HAd previously been on opiate therapies, discontinued when he went to medical marijuana.     Rash to upper chest and lower abdomen, dark skin discoloration, itching, no pain. Tried cortisone, helps some     Colon screen due, last completed in 2022, 1 year recall recommended due to poor prep               Past Medical History:   Diagnosis Date    Knee pain, right     trauma at work 11/15    Medial meniscus tear Nov. 18 2015    Snores     possible apnea but not tested yet        Current Outpatient Medications   Medication Sig Dispense Refill    meloxicam (MOBIC) 15 MG tablet Take 1 tablet by mouth daily 30 tablet 0    baclofen (LIORESAL) 10 MG tablet Take 1 tablet by mouth 2 times daily 60 tablet 1    triamcinolone (ARISTOCORT) 0.5 % cream Apply topically 3 times daily. 30 g 0    ibuprofen (ADVIL;MOTRIN) 800 MG tablet Take 1 tablet by mouth as needed      medical marijuana Inhale into the lungs daily.      cephALEXin (KEFLEX) 500 MG capsule Take 1 capsule by mouth 2 times daily Take 2 doses after recent cystoscopy to prevent infection (Patient not taking: Reported on 5/23/2024) 2 capsule 0    alfuzosin (UROXATRAL) 10 MG extended release tablet Take 1 tablet by mouth daily (Patient not

## 2024-05-23 NOTE — PROGRESS NOTES
Visit Information    Have you changed or started any medications since your last visit including any over-the-counter medicines, vitamins, or herbal medicines? no   Have you stopped taking any of your medications? Is so, why? -  no  Are you having any side effects from any of your medications? - no    Have you seen any other physician or provider since your last visit?  no   Have you had any other diagnostic tests since your last visit?  no   Have you been seen in the emergency room and/or had an admission in a hospital since we last saw you?  no   Have you had your routine dental cleaning in the past 6 months?  no     Do you have an active MyChart account? If no, what is the barrier?  Yes    Patient Care Team:  Zeyad Nolasco APRN - CNP as PCP - General (Certified Nurse Practitioner)  Zeyad Nolasco APRN - CNP as PCP - Empaneled Provider    Medical History Review  Past Medical, Family, and Social History reviewed and  contribute to the patient presenting condition    Health Maintenance   Topic Date Due    Hepatitis B vaccine (1 of 3 - 3-dose series) Never done    Depression Screen  Never done    HIV screen  Never done    Hepatitis C screen  Never done    Lipids  Never done    Diabetes screen  02/09/2019    Shingles vaccine (1 of 2) Never done    COVID-19 Vaccine (2 - 2023-24 season) 09/01/2023    Flu vaccine (Season Ended) 08/01/2024    DTaP/Tdap/Td vaccine (2 - Td or Tdap) 05/09/2026    Colorectal Cancer Screen  04/22/2032    Hepatitis A vaccine  Aged Out    Hib vaccine  Aged Out    Polio vaccine  Aged Out    Meningococcal (ACWY) vaccine  Aged Out    Pneumococcal 0-64 years Vaccine  Aged Out

## 2024-05-30 ENCOUNTER — TELEPHONE (OUTPATIENT)
Dept: SURGERY | Age: 54
End: 2024-05-30

## 2024-05-30 NOTE — TELEPHONE ENCOUNTER
[facility] General Surgery  Screening colonoscopy questionnaire  Dariela Carreon    Pt Name: Emre Ly  MRN: [id number]  YOB: 1970  Primary Care Physician: Zeyad Nolasco APRN - CNP      Have you ever had a colonoscopy? Yes  When was your last colonoscopy?   Were any polyps removed or any other significant findings? POLYPS REMVOED, RECOMMENDED REPEAT COLONOSCOPY IN 1 YEAR D/T POOR BOWEL PREP     Have you recently had a stool test to check for colon cancer? No  Was it positive? No    Do you have any family history of colon cancer? No  If yes, which family member had colon cancer? N/A  Were they diagnosed younger or older than the age of 60?  N/A    Do you have a history of Crohn's disease or Ulcerative Colitis? No    Do you have a history of constipation? No    Do you have a history of bloody or black stools? No    Have you ever had surgery done inside your abdomen? Yes  What surgery? UMBILICAL HERNIA REPAIR    8. Are you taking any blood thinners? No   If yes, what is the name of the blood thinner? N/A    Schedulin/30/24 - Spoke to patient, colonoscopy scheduled at [place], patient confirmed and information mailed to patient.     Surgery date/time: 24 at 11AM  Arrival time: 9:30AM  Prep appt: PHONE CALL, 1WK PRIOR. ARTHUR INSTRUCTIONS MAILED TO PATIENT(S)

## 2024-06-03 ENCOUNTER — TELEPHONE (OUTPATIENT)
Dept: SURGERY | Age: 54
End: 2024-06-03

## 2024-06-06 RX ORDER — POLYETHYLENE GLYCOL 3350, SODIUM SULFATE ANHYDROUS, SODIUM BICARBONATE, SODIUM CHLORIDE, POTASSIUM CHLORIDE 236; 22.74; 6.74; 5.86; 2.97 G/4L; G/4L; G/4L; G/4L; G/4L
4 POWDER, FOR SOLUTION ORAL ONCE
Qty: 4000 ML | Refills: 0 | Status: SHIPPED | OUTPATIENT
Start: 2024-06-06 | End: 2024-06-06

## 2024-06-06 NOTE — DISCHARGE INSTRUCTIONS

## 2024-06-11 ENCOUNTER — HOSPITAL ENCOUNTER (OUTPATIENT)
Age: 54
Discharge: HOME | End: 2024-06-11
Payer: MEDICAID

## 2024-06-11 DIAGNOSIS — Z13.1 DIABETES MELLITUS SCREENING: ICD-10-CM

## 2024-06-11 DIAGNOSIS — Z13.220 LIPID SCREENING: ICD-10-CM

## 2024-06-11 DIAGNOSIS — Z13.29 THYROID DISORDER SCREENING: ICD-10-CM

## 2024-06-11 DIAGNOSIS — Z12.5 PROSTATE CANCER SCREENING: ICD-10-CM

## 2024-06-11 DIAGNOSIS — Z11.4 ENCOUNTER FOR SCREENING FOR HIV: ICD-10-CM

## 2024-06-11 DIAGNOSIS — Z11.59 NEED FOR HEPATITIS C SCREENING TEST: ICD-10-CM

## 2024-06-11 DIAGNOSIS — M54.12 CERVICAL RADICULOPATHY: ICD-10-CM

## 2024-06-11 LAB
ALBUMIN SERPL-MCNC: 4.7 G/DL (ref 3.5–5.2)
ALBUMIN/GLOB SERPL: 2 {RATIO} (ref 1–2.5)
ALP SERPL-CCNC: 69 U/L (ref 40–129)
ALT SERPL-CCNC: 11 U/L (ref 10–50)
ANION GAP SERPL CALCULATED.3IONS-SCNC: 9 MMOL/L (ref 9–16)
AST SERPL-CCNC: 23 U/L (ref 10–50)
BILIRUB SERPL-MCNC: 0.7 MG/DL (ref 0–1.2)
BUN SERPL-MCNC: 10 MG/DL (ref 6–20)
CALCIUM SERPL-MCNC: 9.2 MG/DL (ref 8.6–10.4)
CHLORIDE SERPL-SCNC: 106 MMOL/L (ref 98–107)
CHOLEST SERPL-MCNC: 143 MG/DL (ref 0–199)
CHOLESTEROL/HDL RATIO: 4
CO2 SERPL-SCNC: 28 MMOL/L (ref 20–31)
CREAT SERPL-MCNC: 1.2 MG/DL (ref 0.7–1.2)
ERYTHROCYTE [DISTWIDTH] IN BLOOD BY AUTOMATED COUNT: 13.2 % (ref 11.8–14.4)
EST. AVERAGE GLUCOSE BLD GHB EST-MCNC: 120 MG/DL
GFR, ESTIMATED: 73 ML/MIN/1.73M2
GLUCOSE SERPL-MCNC: 87 MG/DL (ref 74–99)
HBA1C MFR BLD: 5.8 % (ref 4–6)
HCT VFR BLD AUTO: 40.4 % (ref 40.7–50.3)
HCV AB SERPL QL IA: NONREACTIVE
HDLC SERPL-MCNC: 35 MG/DL
HGB BLD-MCNC: 12.8 G/DL (ref 13–17)
HIV 1+2 AB+HIV1 P24 AG SERPL QL IA: NONREACTIVE
LDLC SERPL CALC-MCNC: 93 MG/DL (ref 0–100)
MCH RBC QN AUTO: 28 PG (ref 25.2–33.5)
MCHC RBC AUTO-ENTMCNC: 31.7 G/DL (ref 28.4–34.8)
MCV RBC AUTO: 88.4 FL (ref 82.6–102.9)
NRBC BLD-RTO: 0 PER 100 WBC
PLATELET # BLD AUTO: 201 K/UL (ref 138–453)
PMV BLD AUTO: 10 FL (ref 8.1–13.5)
POTASSIUM SERPL-SCNC: 3.9 MMOL/L (ref 3.7–5.3)
PROT SERPL-MCNC: 6.8 G/DL (ref 6.6–8.7)
PSA SERPL-MCNC: 0.8 NG/ML (ref 0–4)
RBC # BLD AUTO: 4.57 M/UL (ref 4.21–5.77)
SODIUM SERPL-SCNC: 143 MMOL/L (ref 136–145)
TRIGL SERPL-MCNC: 74 MG/DL
TSH SERPL DL<=0.05 MIU/L-ACNC: 0.96 UIU/ML (ref 0.27–4.2)
VLDLC SERPL CALC-MCNC: 15 MG/DL
WBC OTHER # BLD: 4.6 K/UL (ref 3.5–11.3)

## 2024-06-11 PROCEDURE — 86803 HEPATITIS C AB TEST: CPT

## 2024-06-11 PROCEDURE — 80061 LIPID PANEL: CPT

## 2024-06-11 PROCEDURE — G0103 PSA SCREENING: HCPCS

## 2024-06-11 PROCEDURE — 80053 COMPREHEN METABOLIC PANEL: CPT

## 2024-06-11 PROCEDURE — 36415 COLL VENOUS BLD VENIPUNCTURE: CPT

## 2024-06-11 PROCEDURE — 85027 COMPLETE CBC AUTOMATED: CPT

## 2024-06-11 PROCEDURE — 87389 HIV-1 AG W/HIV-1&-2 AB AG IA: CPT

## 2024-06-11 PROCEDURE — 83036 HEMOGLOBIN GLYCOSYLATED A1C: CPT

## 2024-06-11 PROCEDURE — 84443 ASSAY THYROID STIM HORMONE: CPT

## 2024-06-12 ENCOUNTER — ANESTHESIA EVENT (OUTPATIENT)
Dept: OPERATING ROOM | Age: 54
End: 2024-06-12
Payer: MEDICAID

## 2024-06-13 ENCOUNTER — ANESTHESIA (OUTPATIENT)
Dept: OPERATING ROOM | Age: 54
End: 2024-06-13
Payer: MEDICAID

## 2024-06-13 ENCOUNTER — HOSPITAL ENCOUNTER (OUTPATIENT)
Age: 54
Setting detail: OUTPATIENT SURGERY
Discharge: HOME | End: 2024-06-13
Attending: SURGERY | Admitting: SURGERY
Payer: MEDICAID

## 2024-06-13 VITALS
TEMPERATURE: 97 F | BODY MASS INDEX: 28.49 KG/M2 | SYSTOLIC BLOOD PRESSURE: 154 MMHG | RESPIRATION RATE: 19 BRPM | OXYGEN SATURATION: 99 % | HEIGHT: 68 IN | HEART RATE: 84 BPM | WEIGHT: 188 LBS | DIASTOLIC BLOOD PRESSURE: 100 MMHG

## 2024-06-13 DIAGNOSIS — Z12.11 SCREENING FOR COLON CANCER: ICD-10-CM

## 2024-06-13 PROBLEM — Z86.010 HISTORY OF COLON POLYPS: Status: ACTIVE | Noted: 2024-06-13

## 2024-06-13 PROBLEM — Z86.0100 HISTORY OF COLON POLYPS: Status: ACTIVE | Noted: 2024-06-13

## 2024-06-13 PROCEDURE — 2580000003 HC RX 258: Performed by: ANESTHESIOLOGY

## 2024-06-13 PROCEDURE — 6360000002 HC RX W HCPCS: Performed by: NURSE ANESTHETIST, CERTIFIED REGISTERED

## 2024-06-13 PROCEDURE — 45380 COLONOSCOPY AND BIOPSY: CPT | Performed by: SURGERY

## 2024-06-13 PROCEDURE — 3609010300 HC COLONOSCOPY W/BIOPSY SINGLE/MULTIPLE: Performed by: SURGERY

## 2024-06-13 PROCEDURE — 3700000001 HC ADD 15 MINUTES (ANESTHESIA): Performed by: SURGERY

## 2024-06-13 PROCEDURE — 2709999900 HC NON-CHARGEABLE SUPPLY: Performed by: SURGERY

## 2024-06-13 PROCEDURE — 7100000011 HC PHASE II RECOVERY - ADDTL 15 MIN: Performed by: SURGERY

## 2024-06-13 PROCEDURE — 88305 TISSUE EXAM BY PATHOLOGIST: CPT

## 2024-06-13 PROCEDURE — 2500000003 HC RX 250 WO HCPCS: Performed by: NURSE ANESTHETIST, CERTIFIED REGISTERED

## 2024-06-13 PROCEDURE — 3700000000 HC ANESTHESIA ATTENDED CARE: Performed by: SURGERY

## 2024-06-13 PROCEDURE — 7100000010 HC PHASE II RECOVERY - FIRST 15 MIN: Performed by: SURGERY

## 2024-06-13 RX ORDER — PROPOFOL 10 MG/ML
INJECTION, EMULSION INTRAVENOUS PRN
Status: DISCONTINUED | OUTPATIENT
Start: 2024-06-13 | End: 2024-06-13 | Stop reason: SDUPTHER

## 2024-06-13 RX ORDER — LABETALOL HYDROCHLORIDE 5 MG/ML
10 INJECTION, SOLUTION INTRAVENOUS
Status: DISCONTINUED | OUTPATIENT
Start: 2024-06-13 | End: 2024-06-13 | Stop reason: HOSPADM

## 2024-06-13 RX ORDER — SODIUM CHLORIDE 9 MG/ML
INJECTION, SOLUTION INTRAVENOUS PRN
Status: DISCONTINUED | OUTPATIENT
Start: 2024-06-13 | End: 2024-06-13 | Stop reason: HOSPADM

## 2024-06-13 RX ORDER — FENTANYL CITRATE 50 UG/ML
INJECTION, SOLUTION INTRAMUSCULAR; INTRAVENOUS PRN
Status: DISCONTINUED | OUTPATIENT
Start: 2024-06-13 | End: 2024-06-13 | Stop reason: SDUPTHER

## 2024-06-13 RX ORDER — SODIUM CHLORIDE, SODIUM LACTATE, POTASSIUM CHLORIDE, CALCIUM CHLORIDE 600; 310; 30; 20 MG/100ML; MG/100ML; MG/100ML; MG/100ML
INJECTION, SOLUTION INTRAVENOUS CONTINUOUS
Status: DISCONTINUED | OUTPATIENT
Start: 2024-06-13 | End: 2024-06-13 | Stop reason: HOSPADM

## 2024-06-13 RX ORDER — LIDOCAINE HYDROCHLORIDE 10 MG/ML
1 INJECTION, SOLUTION EPIDURAL; INFILTRATION; INTRACAUDAL; PERINEURAL
Status: DISCONTINUED | OUTPATIENT
Start: 2024-06-13 | End: 2024-06-13 | Stop reason: HOSPADM

## 2024-06-13 RX ORDER — PROCHLORPERAZINE EDISYLATE 5 MG/ML
5 INJECTION INTRAMUSCULAR; INTRAVENOUS
Status: DISCONTINUED | OUTPATIENT
Start: 2024-06-13 | End: 2024-06-13 | Stop reason: HOSPADM

## 2024-06-13 RX ORDER — NALOXONE HYDROCHLORIDE 0.4 MG/ML
INJECTION, SOLUTION INTRAMUSCULAR; INTRAVENOUS; SUBCUTANEOUS PRN
Status: DISCONTINUED | OUTPATIENT
Start: 2024-06-13 | End: 2024-06-13 | Stop reason: HOSPADM

## 2024-06-13 RX ORDER — GLYCOPYRROLATE 1 MG/5 ML
SYRINGE (ML) INTRAVENOUS PRN
Status: DISCONTINUED | OUTPATIENT
Start: 2024-06-13 | End: 2024-06-13 | Stop reason: SDUPTHER

## 2024-06-13 RX ORDER — SODIUM CHLORIDE 0.9 % (FLUSH) 0.9 %
5-40 SYRINGE (ML) INJECTION PRN
Status: DISCONTINUED | OUTPATIENT
Start: 2024-06-13 | End: 2024-06-13 | Stop reason: HOSPADM

## 2024-06-13 RX ORDER — SODIUM CHLORIDE 0.9 % (FLUSH) 0.9 %
5-40 SYRINGE (ML) INJECTION EVERY 12 HOURS SCHEDULED
Status: DISCONTINUED | OUTPATIENT
Start: 2024-06-13 | End: 2024-06-13 | Stop reason: HOSPADM

## 2024-06-13 RX ORDER — PROPOFOL 10 MG/ML
INJECTION, EMULSION INTRAVENOUS CONTINUOUS PRN
Status: DISCONTINUED | OUTPATIENT
Start: 2024-06-13 | End: 2024-06-13 | Stop reason: SDUPTHER

## 2024-06-13 RX ORDER — LIDOCAINE HYDROCHLORIDE 10 MG/ML
INJECTION, SOLUTION INFILTRATION; PERINEURAL PRN
Status: DISCONTINUED | OUTPATIENT
Start: 2024-06-13 | End: 2024-06-13 | Stop reason: SDUPTHER

## 2024-06-13 RX ORDER — HYDRALAZINE HYDROCHLORIDE 20 MG/ML
10 INJECTION INTRAMUSCULAR; INTRAVENOUS
Status: DISCONTINUED | OUTPATIENT
Start: 2024-06-13 | End: 2024-06-13 | Stop reason: HOSPADM

## 2024-06-13 RX ADMIN — FENTANYL CITRATE 100 MCG: 50 INJECTION, SOLUTION INTRAMUSCULAR; INTRAVENOUS at 09:30

## 2024-06-13 RX ADMIN — Medication 0.4 MG: at 09:33

## 2024-06-13 RX ADMIN — LIDOCAINE HYDROCHLORIDE 50 MG: 10 INJECTION, SOLUTION INFILTRATION; PERINEURAL at 09:33

## 2024-06-13 RX ADMIN — SODIUM CHLORIDE, POTASSIUM CHLORIDE, SODIUM LACTATE AND CALCIUM CHLORIDE: 600; 310; 30; 20 INJECTION, SOLUTION INTRAVENOUS at 09:30

## 2024-06-13 RX ADMIN — PROPOFOL 100 MG: 10 INJECTION, EMULSION INTRAVENOUS at 09:33

## 2024-06-13 RX ADMIN — PROPOFOL 150 MCG/KG/MIN: 10 INJECTION, EMULSION INTRAVENOUS at 09:33

## 2024-06-13 ASSESSMENT — PAIN - FUNCTIONAL ASSESSMENT
PAIN_FUNCTIONAL_ASSESSMENT: 0-10
PAIN_FUNCTIONAL_ASSESSMENT: NONE - DENIES PAIN

## 2024-06-13 ASSESSMENT — LIFESTYLE VARIABLES: SMOKING_STATUS: 1

## 2024-06-13 NOTE — H&P
[facility] General Surgery  [facility]      Patient's Name/ Date of Birth/ Gender: Emre Ly / 1970 (53 y.o.) / male     Attending physician: Jean Grant DO    CC: colorectal cancer screening    History of present Illness: Emre Ly is a 53 y.o. male, presents today for colonoscopy for:    There are no active hospital problems to display for this patient.        Colonoscopy history: Last cscope was 2022, significant for hyperplastic polyps and poor bowel prep.      Past Medical History:  has a past medical history of Knee pain, right, Medial meniscus tear, and Snores.    Past Surgical History:   Past Surgical History:   Procedure Laterality Date    COLONOSCOPY  04/22/2022    COLONOSCOPY N/A 4/22/2022    COLONOSCOPY POLYPECTOMY HOT BIOPSY WITH INTERNAL HEMORRHOID TREATMENT performed by Jean Grant DO at [facility] OR    KNEE ARTHROSCOPY Right 5/3/16    chondroplasty/synovectomy    ROTATOR CUFF REPAIR      UMBILICAL HERNIA REPAIR  1972    VASECTOMY         Social History:  reports that he quit smoking about 11 years ago. His smoking use included cigarettes. He started smoking about 32 years ago. He has never used smokeless tobacco. He reports current drug use. Drug: Marijuana (Weed). He reports that he does not drink alcohol.    Family History: family history is not on file.    Review of Systems:   General: Denies fever, chills, night sweats, weight loss, malaise, fatigue  HEENT: Denies sore throat, sinus problems, allergic rhinosinusitis  Card: Denies chest pain, palpitations, orthopnea/PND. Denies h/o murmurs  Pulm: Denies cough, shortness of breath, MORSE  GI:  per HPI; denies history of constipation, diarrhea, hematochezia or melena  : Denies polyuria, dysuria, hematuria  Endo: Denies diabetes, thyroid problems.  Heme: Denies anemia, h/o bleeding or clotting problems.   Neuro: Denies h/o CVA, TIA  Skin: Denies rashes, ulcers  Musculoskeletal: Denies muscle,

## 2024-06-13 NOTE — ANESTHESIA POSTPROCEDURE EVALUATION
Department of Anesthesiology  Postprocedure Note    Patient: Emre Ly  MRN: [id number]  YOB: 1970  Date of evaluation: 6/13/2024    Procedure Summary       Date: 06/13/24 Room / Location: [address]    Anesthesia Start: 0930 Anesthesia Stop: 0952    Procedure: Colonoscopy with Descending and Sigmoid Colon Polypectomy and Rectal Polypectomies Diagnosis:       Screening for colon cancer      (Screening for colon cancer [Z12.11])    Surgeons: Jean Grant DO Responsible Provider: Mike Pearce MD    Anesthesia Type: TIVA ASA Status: 3            Anesthesia Type: No value filed.    Kendra Phase I: Kendra Score: 8    Kendra Phase II: Kendra Score: 9    Anesthesia Post Evaluation    Patient location during evaluation: PACU  Patient participation: complete - patient participated  Level of consciousness: awake and awake and alert  Pain score: 0  Nausea & Vomiting: no nausea and no vomiting  Cardiovascular status: hemodynamically stable and blood pressure returned to baseline  Respiratory status: acceptable  Hydration status: euvolemic  Multimodal analgesia pain management approach  Pain management: adequate    No notable events documented.

## 2024-06-13 NOTE — ANESTHESIA PRE PROCEDURE
Department of Anesthesiology  Preprocedure Note       Name:  Emre Ly   Age:  53 y.o.  :  1970                                          MRN:  [id number]         Date:  2024      Surgeon: Surgeon(s):  Jean Grant DO    Procedure: Procedure(s):  COLORECTAL CANCER SCREENING, NOT HIGH RISK    Medications prior to admission:   Prior to Admission medications    Medication Sig Start Date End Date Taking? Authorizing Provider   meloxicam (MOBIC) 15 MG tablet Take 1 tablet by mouth daily 24   Zeyad Noalsco APRN - CNP   baclofen (LIORESAL) 10 MG tablet Take 1 tablet by mouth 2 times daily 24   Zeyad Nolasco APRN - CNP   triamcinolone (ARISTOCORT) 0.5 % cream Apply topically 3 times daily. 24   Zeyad Nolasco APRN - CNP   cephALEXin (KEFLEX) 500 MG capsule Take 1 capsule by mouth 2 times daily Take 2 doses after recent cystoscopy to prevent infection  Patient not taking: Reported on 2024   Kris Curtis MD   alfuzosin (UROXATRAL) 10 MG extended release tablet Take 1 tablet by mouth daily  Patient not taking: Reported on 2024   Kris Curtis MD   ibuprofen (ADVIL;MOTRIN) 800 MG tablet Take 1 tablet by mouth as needed    ProviderKings MD   medical marijuana Inhale into the lungs daily.    Provider, MD Kings       Current medications:    Current Facility-Administered Medications   Medication Dose Route Frequency Provider Last Rate Last Admin   • lidocaine PF 1 % injection 1 mL  1 mL IntraDERmal Once PRN Landen Barrett MD       • lactated ringers IV soln infusion   IntraVENous Continuous Landen Barrett MD       • sodium chloride flush 0.9 % injection 5-40 mL  5-40 mL IntraVENous 2 times per day Landen Barrett MD       • sodium chloride flush 0.9 % injection 5-40 mL  5-40 mL IntraVENous PRN Landen Barrett MD       • 0.9 % sodium chloride infusion   IntraVENous PRN Landen Barrett MD

## 2024-06-13 NOTE — OP NOTE
rectum without difficulty and the scope was advanced to the cecum which was identified by anatomy and by palpation. Bowel prep was adequate. The scope was slowly withdrawn visualizing the cecum, ascending colon, transverse colon, descending colon, sigmoid colon and rectum. The scope was withdrawn to the rectal vault and then retroflexed. The scope was then straightened and removed. The patient tolerated the procedure well and was transferred to the recovery unit in stable condition.    Findings:    Polyps: removed with cold forceps  Descending polyp 6mm  Sigmoid polyp 3mm x2  Rectal polyps x5 3mm to 6mm      Other findings:   internal hemorrhoids        Greater than 9 minutes was spent withdrawing the colonoscope.      IMPRESSION/PLAN:   Increase dietary fiber and water  Patient is advised to have a repeat colonoscopy in 5-7 years unless otherwise dictated by pathology  Colonoscopy sooner if blood in the stool, unexplained weight loss, or change in the bowels        Electronically signed by Jean Grant DO on 6/13/2024 at 9:49 AM

## 2024-06-14 LAB — SURGICAL PATHOLOGY REPORT: NORMAL

## 2025-01-14 ENCOUNTER — OFFICE VISIT (OUTPATIENT)
Dept: FAMILY MEDICINE CLINIC | Age: 55
End: 2025-01-14
Payer: MEDICAID

## 2025-01-14 VITALS
BODY MASS INDEX: 30.04 KG/M2 | DIASTOLIC BLOOD PRESSURE: 70 MMHG | WEIGHT: 198.2 LBS | HEART RATE: 50 BPM | TEMPERATURE: 97.6 F | SYSTOLIC BLOOD PRESSURE: 126 MMHG | OXYGEN SATURATION: 95 % | HEIGHT: 68 IN

## 2025-01-14 DIAGNOSIS — M54.12 CERVICAL RADICULOPATHY: Primary | ICD-10-CM

## 2025-01-14 DIAGNOSIS — Z23 NEED FOR INFLUENZA VACCINATION: ICD-10-CM

## 2025-01-14 DIAGNOSIS — L30.9 DERMATITIS: ICD-10-CM

## 2025-01-14 PROCEDURE — 90471 IMMUNIZATION ADMIN: CPT | Performed by: NURSE PRACTITIONER

## 2025-01-14 PROCEDURE — 99214 OFFICE O/P EST MOD 30 MIN: CPT | Performed by: NURSE PRACTITIONER

## 2025-01-14 PROCEDURE — 90661 CCIIV3 VAC ABX FR 0.5 ML IM: CPT | Performed by: NURSE PRACTITIONER

## 2025-01-14 RX ORDER — CLOTRIMAZOLE AND BETAMETHASONE DIPROPIONATE 10; .64 MG/G; MG/G
CREAM TOPICAL
Qty: 45 G | Refills: 0 | Status: SHIPPED | OUTPATIENT
Start: 2025-01-14

## 2025-01-14 RX ORDER — TIZANIDINE 2 MG/1
2 TABLET ORAL 3 TIMES DAILY PRN
Qty: 30 TABLET | Refills: 1 | Status: SHIPPED | OUTPATIENT
Start: 2025-01-14

## 2025-01-14 SDOH — ECONOMIC STABILITY: FOOD INSECURITY: WITHIN THE PAST 12 MONTHS, YOU WORRIED THAT YOUR FOOD WOULD RUN OUT BEFORE YOU GOT MONEY TO BUY MORE.: NEVER TRUE

## 2025-01-14 SDOH — ECONOMIC STABILITY: FOOD INSECURITY: WITHIN THE PAST 12 MONTHS, THE FOOD YOU BOUGHT JUST DIDN'T LAST AND YOU DIDN'T HAVE MONEY TO GET MORE.: NEVER TRUE

## 2025-01-14 ASSESSMENT — PATIENT HEALTH QUESTIONNAIRE - PHQ9
SUM OF ALL RESPONSES TO PHQ QUESTIONS 1-9: 0
SUM OF ALL RESPONSES TO PHQ9 QUESTIONS 1 & 2: 0
1. LITTLE INTEREST OR PLEASURE IN DOING THINGS: NOT AT ALL
SUM OF ALL RESPONSES TO PHQ QUESTIONS 1-9: 0
2. FEELING DOWN, DEPRESSED OR HOPELESS: NOT AT ALL

## 2025-01-14 ASSESSMENT — ENCOUNTER SYMPTOMS
EYE PAIN: 0
COUGH: 0
NAUSEA: 0
DIARRHEA: 0
SORE THROAT: 0
BACK PAIN: 0
VOMITING: 0
ABDOMINAL PAIN: 0
SINUS PAIN: 0
SHORTNESS OF BREATH: 0

## 2025-01-14 NOTE — PATIENT INSTRUCTIONS
Neck Pain: Care Instructions  Your Care Instructions     You can have neck pain anywhere from the bottom of your head to the top of your shoulders. It can spread to the upper back or arms. Injuries, painting a ceiling, sleeping with your neck twisted, staying in one position for too long, and many other activities can cause neck pain.  Most neck pain gets better with home care. Your doctor may recommend medicine to relieve pain or relax your muscles. He or she may suggest exercise and physical therapy to increase flexibility and relieve stress. You may need to wear a special (cervical) collar to support your neck for a day or two.  Follow-up care is a key part of your treatment and safety. Be sure to make and go to all appointments, and call your doctor if you are having problems. It's also a good idea to know your test results and keep a list of the medicines you take.  How can you care for yourself at home?  Try using a heating pad on a low or medium setting for 15 to 20 minutes every 2 or 3 hours. Try a warm shower in place of one session with the heating pad.  You can also try an ice pack for 10 to 15 minutes every 2 to 3 hours. Put a thin cloth between the ice and your skin.  Take pain medicines exactly as directed.  If the doctor gave you a prescription medicine for pain, take it as prescribed.  If you are not taking a prescription pain medicine, ask your doctor if you can take an over-the-counter medicine.  If your doctor recommends a cervical collar, wear it exactly as directed.  When should you call for help?   Call your doctor now or seek immediate medical care if:    You have new or worsening numbness in your arms, buttocks or legs.     You have new or worsening weakness in your arms or legs. (This could make it hard to stand up.)     You lose control of your bladder or bowels.   Watch closely for changes in your health, and be sure to contact your doctor if:    Your neck pain is getting worse.     You

## 2025-01-14 NOTE — PROGRESS NOTES
[facility]  [facility]  [address] [address]  Dept: [phone]  Dept Fax: [phone]    Emre Ly is a 54 y.o. male who presents today for his medicalconditions/complaints as noted below.  Emre Ly is c/o of Rash (Itchy rash on neck and abd) and Neck Pain (Neck pain in the cold weather. )      HPI:     54-year-old male patient presents with complaints of encounter to follow up     Significant history of chronic neck pain, cervical radiculopathy.  Reports that he has had chronic neck pain for several years.  Follows with [facility] for pain, recent cervical injections. Reports no significant improvement in pain at present. Had previously been on opiate therapies, discontinued when he went to medical marijuana. Last mri completed in 2022, no acute injury or trauma - has noticed pain and stiffness from cold weather     Rash to upper chest and lower abdomen, dark skin discoloration, itching, no pain. Tried cortisone, helps some, no improvement with trimcinolone    Predm A1c 5.8                  Past Medical History:   Diagnosis Date    Knee pain, right     trauma at work 11/15    Medial meniscus tear Nov. 18 2015    Snores     possible apnea but not tested yet        Current Outpatient Medications   Medication Sig Dispense Refill    clotrimazole-betamethasone (LOTRISONE) 1-0.05 % cream Apply topically 2 times daily. 45 g 0    tiZANidine (ZANAFLEX) 2 MG tablet Take 1 tablet by mouth 3 times daily as needed (neck pain) 30 tablet 1    Handicap Placard MISC by Does not apply route Duration: lifetime 1 each 0    meloxicam (MOBIC) 15 MG tablet Take 1 tablet by mouth daily 30 tablet 0    baclofen (LIORESAL) 10 MG tablet Take 1 tablet by mouth 2 times daily 60 tablet 1    triamcinolone (ARISTOCORT) 0.5 % cream Apply topically 3 times daily. 30 g 0    ibuprofen (ADVIL;MOTRIN) 800 MG tablet Take 1 tablet by mouth as needed

## 2025-01-14 NOTE — PROGRESS NOTES
Visit Information    Have you changed or started any medications since your last visit including any over-the-counter medicines, vitamins, or herbal medicines? no   Have you stopped taking any of your medications? Is so, why? -  no  Are you having any side effects from any of your medications? - no    Have you seen any other physician or provider since your last visit?  no   Have you had any other diagnostic tests since your last visit?  no   Have you been seen in the emergency room and/or had an admission in a hospital since we last saw you?  no   Have you had your routine dental cleaning in the past 6 months?  no     Do you have an active MyChart account? If no, what is the barrier?  Yes    Patient Care Team:  Zeyad Nolasco APRN - CNP as PCP - General (Certified Nurse Practitioner)  Zeyad Nolasco APRN - CNP as PCP - Empaneled Provider    Medical History Review  Past Medical, Family, and Social History reviewed and  contribute to the patient presenting condition    Health Maintenance   Topic Date Due    Hepatitis B vaccine (1 of 3 - 19+ 3-dose series) Never done    Shingles vaccine (1 of 2) Never done    Flu vaccine (1) Never done    COVID-19 Vaccine (2 - 2023-24 season) 09/01/2024    Depression Screen  05/23/2025    A1C test (Diabetic or Prediabetic)  06/11/2025    DTaP/Tdap/Td vaccine (2 - Td or Tdap) 05/09/2026    Lipids  06/11/2029    Colorectal Cancer Screen  06/13/2034    Hepatitis C screen  Completed    HIV screen  Completed    Hepatitis A vaccine  Aged Out    Hib vaccine  Aged Out    Polio vaccine  Aged Out    Meningococcal (ACWY) vaccine  Aged Out    Pneumococcal 0-64 years Vaccine  Aged Out    Diabetes screen  Discontinued

## 2025-03-12 ENCOUNTER — TELEPHONE (OUTPATIENT)
Dept: FAMILY MEDICINE CLINIC | Age: 55
End: 2025-03-12

## 2025-03-12 NOTE — TELEPHONE ENCOUNTER
----- Message from Heri SHUKLA sent at 3/7/2025 10:59 AM EST -----  Regarding: ECC Escalation To Practice  ECC Escalation To Practice      Type of Escalation: Acute Care Symptom  --------------------------------------------------------------------------------------------------------------------------    Information for Provider:  Patient is looking for appointment for: Symptom Abdominal Pain   Reasons for Message: Unable to reach practice     Additional Information Patient wanted to be seen again in regards to his condition that he is experiencing since his stomach is still experiencing pain and mentioned it's ongoing for about 2 months now.  --------------------------------------------------------------------------------------------------------------------------    Relationship to Patient: Self  Call Back Info: OK to leave message on voicemail  Preferred Call Back Number: Phone [phone]

## 2025-03-12 NOTE — TELEPHONE ENCOUNTER
Called the phone number provided below- unable to reach the patient. [phone]     Called the patients phone number on file and it was disconnected.     Please attempted to contact the patient later.

## (undated) DEVICE — BASIN EMSIS 700ML GRAPHITE PLAS KID SHP GRAD

## (undated) DEVICE — SNARE ENDOSCP L240CM SHTH DIA2.4MM LOOP W20MM MIN WRK CHN

## (undated) DEVICE — GLOVE ORANGE PI 7   MSG9070

## (undated) DEVICE — SYRINGE MED 50ML LUERLOCK TIP

## (undated) DEVICE — FORCEPS BX L240CM JAW DIA2.4MM ORNG L CAP W/ NDL DISP RAD

## (undated) DEVICE — PERRYSBURG ENDO PACK: Brand: MEDLINE INDUSTRIES, INC.

## (undated) DEVICE — ERBE NESSY®PLATE 170 SPLIT; 168CM²; CABLE 3M: Brand: ERBE

## (undated) DEVICE — ADAPTER,CATHETER/SYRINGE/LUER,STERILE: Brand: MEDLINE

## (undated) DEVICE — Device: Brand: DEFENDO VALVE AND CONNECTOR KIT

## (undated) DEVICE — CONNECTOR TBNG AUX H2O JET DISP FOR OLY 160/180 SER

## (undated) DEVICE — TUBING, SUCTION, 3/16" X 10', STRAIGHT: Brand: MEDLINE

## (undated) DEVICE — FLUFF UNDERPAD,MODERATE: Brand: WINGS

## (undated) DEVICE — SPONGE GZ W4XL4IN RAYON POLY FILL CVR W/ NONWOVEN FAB

## (undated) DEVICE — ADAPTER CLEANING PORPOISE CLEANING

## (undated) DEVICE — FORCEPS BPLR FOR GRD I AND II INT HEMORRHOID HET

## (undated) DEVICE — 4-PORT MANIFOLD: Brand: NEPTUNE 2

## (undated) DEVICE — GOWN,AURORA,NONRNF,XL,30/CS: Brand: MEDLINE